# Patient Record
Sex: FEMALE | Race: BLACK OR AFRICAN AMERICAN | NOT HISPANIC OR LATINO
[De-identification: names, ages, dates, MRNs, and addresses within clinical notes are randomized per-mention and may not be internally consistent; named-entity substitution may affect disease eponyms.]

---

## 2020-05-07 ENCOUNTER — APPOINTMENT (OUTPATIENT)
Dept: HUMAN REPRODUCTION | Facility: CLINIC | Age: 25
End: 2020-05-07
Payer: COMMERCIAL

## 2020-05-07 PROCEDURE — 99203 OFFICE O/P NEW LOW 30 MIN: CPT | Mod: 95

## 2020-05-21 ENCOUNTER — APPOINTMENT (OUTPATIENT)
Dept: HUMAN REPRODUCTION | Facility: CLINIC | Age: 25
End: 2020-05-21
Payer: COMMERCIAL

## 2020-05-21 PROCEDURE — 36415 COLL VENOUS BLD VENIPUNCTURE: CPT

## 2020-05-21 PROCEDURE — 76830 TRANSVAGINAL US NON-OB: CPT

## 2020-05-21 PROCEDURE — 99214 OFFICE O/P EST MOD 30 MIN: CPT | Mod: 25

## 2020-05-22 ENCOUNTER — TRANSCRIPTION ENCOUNTER (OUTPATIENT)
Age: 25
End: 2020-05-22

## 2022-08-10 ENCOUNTER — INPATIENT (INPATIENT)
Facility: HOSPITAL | Age: 27
LOS: 4 days | Discharge: HOME | End: 2022-08-15
Attending: OBSTETRICS & GYNECOLOGY | Admitting: OBSTETRICS & GYNECOLOGY

## 2022-08-10 VITALS
DIASTOLIC BLOOD PRESSURE: 73 MMHG | TEMPERATURE: 98 F | SYSTOLIC BLOOD PRESSURE: 125 MMHG | HEART RATE: 91 BPM | RESPIRATION RATE: 14 BRPM

## 2022-08-10 DIAGNOSIS — J45.909 UNSPECIFIED ASTHMA, UNCOMPLICATED: ICD-10-CM

## 2022-08-10 DIAGNOSIS — O34.32 MATERNAL CARE FOR CERVICAL INCOMPETENCE, SECOND TRIMESTER: ICD-10-CM

## 2022-08-10 DIAGNOSIS — O99.512 DISEASES OF THE RESPIRATORY SYSTEM COMPLICATING PREGNANCY, SECOND TRIMESTER: ICD-10-CM

## 2022-08-10 DIAGNOSIS — Z3A.20 20 WEEKS GESTATION OF PREGNANCY: ICD-10-CM

## 2022-08-10 DIAGNOSIS — Z98.890 OTHER SPECIFIED POSTPROCEDURAL STATES: Chronic | ICD-10-CM

## 2022-08-10 LAB
APPEARANCE UR: CLEAR — SIGNIFICANT CHANGE UP
BASOPHILS # BLD AUTO: 0.03 K/UL — SIGNIFICANT CHANGE UP (ref 0–0.2)
BASOPHILS NFR BLD AUTO: 0.2 % — SIGNIFICANT CHANGE UP (ref 0–1)
BILIRUB UR-MCNC: NEGATIVE — SIGNIFICANT CHANGE UP
COLOR SPEC: YELLOW — SIGNIFICANT CHANGE UP
DIFF PNL FLD: NEGATIVE — SIGNIFICANT CHANGE UP
EOSINOPHIL # BLD AUTO: 0.14 K/UL — SIGNIFICANT CHANGE UP (ref 0–0.7)
EOSINOPHIL NFR BLD AUTO: 1.2 % — SIGNIFICANT CHANGE UP (ref 0–8)
GLUCOSE UR QL: SIGNIFICANT CHANGE UP
HCT VFR BLD CALC: 32.8 % — LOW (ref 37–47)
HGB BLD-MCNC: 10.7 G/DL — LOW (ref 12–16)
IMM GRANULOCYTES NFR BLD AUTO: 0.4 % — HIGH (ref 0.1–0.3)
KETONES UR-MCNC: SIGNIFICANT CHANGE UP
LEUKOCYTE ESTERASE UR-ACNC: NEGATIVE — SIGNIFICANT CHANGE UP
LYMPHOCYTES # BLD AUTO: 27.5 % — SIGNIFICANT CHANGE UP (ref 20.5–51.1)
LYMPHOCYTES # BLD AUTO: 3.35 K/UL — SIGNIFICANT CHANGE UP (ref 1.2–3.4)
MCHC RBC-ENTMCNC: 26.6 PG — LOW (ref 27–31)
MCHC RBC-ENTMCNC: 32.6 G/DL — SIGNIFICANT CHANGE UP (ref 32–37)
MCV RBC AUTO: 81.4 FL — SIGNIFICANT CHANGE UP (ref 81–99)
MONOCYTES # BLD AUTO: 0.59 K/UL — SIGNIFICANT CHANGE UP (ref 0.1–0.6)
MONOCYTES NFR BLD AUTO: 4.8 % — SIGNIFICANT CHANGE UP (ref 1.7–9.3)
NEUTROPHILS # BLD AUTO: 8.01 K/UL — HIGH (ref 1.4–6.5)
NEUTROPHILS NFR BLD AUTO: 65.9 % — SIGNIFICANT CHANGE UP (ref 42.2–75.2)
NITRITE UR-MCNC: NEGATIVE — SIGNIFICANT CHANGE UP
NRBC # BLD: 0 /100 WBCS — SIGNIFICANT CHANGE UP (ref 0–0)
PH UR: 6.5 — SIGNIFICANT CHANGE UP (ref 5–8)
PLATELET # BLD AUTO: 270 K/UL — SIGNIFICANT CHANGE UP (ref 130–400)
PROT UR-MCNC: SIGNIFICANT CHANGE UP
RBC # BLD: 4.03 M/UL — LOW (ref 4.2–5.4)
RBC # FLD: 16 % — HIGH (ref 11.5–14.5)
SARS-COV-2 RNA SPEC QL NAA+PROBE: SIGNIFICANT CHANGE UP
SP GR SPEC: 1.03 — SIGNIFICANT CHANGE UP (ref 1.01–1.03)
UROBILINOGEN FLD QL: SIGNIFICANT CHANGE UP
WBC # BLD: 12.17 K/UL — HIGH (ref 4.8–10.8)
WBC # FLD AUTO: 12.17 K/UL — HIGH (ref 4.8–10.8)

## 2022-08-10 RX ORDER — OXYTOCIN 10 UNIT/ML
333.33 VIAL (ML) INJECTION
Qty: 20 | Refills: 0 | Status: DISCONTINUED | OUTPATIENT
Start: 2022-08-10 | End: 2022-08-15

## 2022-08-10 RX ORDER — AMPICILLIN TRIHYDRATE 250 MG
2 CAPSULE ORAL EVERY 6 HOURS
Refills: 0 | Status: DISCONTINUED | OUTPATIENT
Start: 2022-08-10 | End: 2022-08-11

## 2022-08-10 RX ORDER — CHLORHEXIDINE GLUCONATE 213 G/1000ML
1 SOLUTION TOPICAL ONCE
Refills: 0 | Status: DISCONTINUED | OUTPATIENT
Start: 2022-08-10 | End: 2022-08-11

## 2022-08-10 RX ORDER — AZITHROMYCIN 500 MG/1
1 TABLET, FILM COATED ORAL ONCE
Refills: 0 | Status: COMPLETED | OUTPATIENT
Start: 2022-08-10 | End: 2022-08-10

## 2022-08-10 RX ORDER — SODIUM CHLORIDE 9 MG/ML
1000 INJECTION, SOLUTION INTRAVENOUS
Refills: 0 | Status: DISCONTINUED | OUTPATIENT
Start: 2022-08-10 | End: 2022-08-11

## 2022-08-10 RX ADMIN — Medication 200 GRAM(S): at 22:59

## 2022-08-10 RX ADMIN — AZITHROMYCIN 1 GRAM(S): 500 TABLET, FILM COATED ORAL at 22:56

## 2022-08-10 NOTE — OB PROVIDER H&P - NSHPPHYSICALEXAM_GEN_ALL_CORE
Vital Signs Last 24 Hrs  T(C): 36.8 (10 Aug 2022 19:46), Max: 36.8 (10 Aug 2022 19:46)  T(F): 98.2 (10 Aug 2022 19:46), Max: 98.2 (10 Aug 2022 19:46)  HR: 91 (10 Aug 2022 19:59) (91 - 91)  BP: 125/73 (10 Aug 2022 19:59) (125/73 - 125/73)  RR: 14 (10 Aug 2022 19:46) (14 - 14)    Gen: AAOx3, NAD  Heart; S1S2, RRR  Lungs: CTAB  Abd: soft, nontender, nondistended, no palpable contraction  Speculum: appeared dilated, bulging membrane  TVUS: membranes coming through the external os, 3cm dilation  BSS: cephalic, posterior placenta, FH 173bpm, gross fetal movement noted

## 2022-08-10 NOTE — OB PROVIDER H&P - NSLASTDATEVISIT_OBGYN_ALL_OB
Biometrics completed.     Results reviewed with a Registered Nurse; understanding of results and educational materials was verbalized.   Unknown

## 2022-08-10 NOTE — OB RN TRIAGE NOTE - FALL HARM RISK - RISK INTERVENTIONS

## 2022-08-10 NOTE — OB PROVIDER H&P - ASSESSMENT
27yo  at 20w0d, GBS unknown, with cervical insufficiency by examination, no signs of infection or  labor, for observation    -Discussed with patient and  over the phone the findings of examination. Explained cervical insufficiency and the eligibility for rescue cerclage, also explained that in this scenario, the likelihood of a successful cerclage placement is low. Options of expectant management vs IOL for termination of pregnancy also discussed. All questions answered, patient chooses to have expectant management and assessment of cerclage placement in the AM.  -admit to L&D  -admission labs  -blood cultures, Urine culture, COVID swab  -NPO after midnight  -M consult: start latency antibiotics, will re-examine in AM for eligibility of emergency rescue cerclage  -strict bedrest, with Trendelenburg  -SCDs  -IV hydration  -Ampicillin 2g q6hr, Azithromycin 1g PO once  -continuous tocometer    Dr. Bermeo and Dr. Avitia aware

## 2022-08-10 NOTE — OB PROVIDER H&P - HISTORY OF PRESENT ILLNESS
25yo  at 20w0d, ALEX 2022 by LMP (3/23/2022) c/w early ultrasound, presents from PMD office with concern that "cervix was dilated on ultrasound" during a routine scan outpatient today. Denies vaginal bleeding, abnormal discharge, or cramping pain. Also denies fever, chills, nausea/vomiting, change in bowel habits, dysuria, severe abdominal pain. This pregnancy has been uncomplicated so far. No history of leep or cervical conization.

## 2022-08-11 LAB
ALBUMIN SERPL ELPH-MCNC: 3.4 G/DL — LOW (ref 3.5–5.2)
ALP SERPL-CCNC: 55 U/L — SIGNIFICANT CHANGE UP (ref 30–115)
ALT FLD-CCNC: 68 U/L — HIGH (ref 0–41)
AMPHET UR-MCNC: NEGATIVE — SIGNIFICANT CHANGE UP
ANION GAP SERPL CALC-SCNC: 12 MMOL/L — SIGNIFICANT CHANGE UP (ref 7–14)
AST SERPL-CCNC: 43 U/L — HIGH (ref 0–41)
BARBITURATES UR SCN-MCNC: NEGATIVE — SIGNIFICANT CHANGE UP
BASOPHILS # BLD AUTO: 0.02 K/UL — SIGNIFICANT CHANGE UP (ref 0–0.2)
BASOPHILS NFR BLD AUTO: 0.2 % — SIGNIFICANT CHANGE UP (ref 0–1)
BENZODIAZ UR-MCNC: NEGATIVE — SIGNIFICANT CHANGE UP
BILIRUB SERPL-MCNC: <0.2 MG/DL — SIGNIFICANT CHANGE UP (ref 0.2–1.2)
BLD GP AB SCN SERPL QL: SIGNIFICANT CHANGE UP
BLD GP AB SCN SERPL QL: SIGNIFICANT CHANGE UP
BUN SERPL-MCNC: 5 MG/DL — LOW (ref 10–20)
BUPRENORPHINE SCREEN, URINE RESULT: NEGATIVE — SIGNIFICANT CHANGE UP
C TRACH RRNA SPEC QL NAA+PROBE: SIGNIFICANT CHANGE UP
CALCIUM SERPL-MCNC: 8.8 MG/DL — SIGNIFICANT CHANGE UP (ref 8.5–10.1)
CHLORIDE SERPL-SCNC: 102 MMOL/L — SIGNIFICANT CHANGE UP (ref 98–110)
CO2 SERPL-SCNC: 22 MMOL/L — SIGNIFICANT CHANGE UP (ref 17–32)
COCAINE METAB.OTHER UR-MCNC: NEGATIVE — SIGNIFICANT CHANGE UP
CREAT SERPL-MCNC: 0.6 MG/DL — LOW (ref 0.7–1.5)
CRP SERPL-MCNC: 17.4 MG/L — HIGH
EGFR: 126 ML/MIN/1.73M2 — SIGNIFICANT CHANGE UP
EOSINOPHIL # BLD AUTO: 0.1 K/UL — SIGNIFICANT CHANGE UP (ref 0–0.7)
EOSINOPHIL NFR BLD AUTO: 0.9 % — SIGNIFICANT CHANGE UP (ref 0–8)
FENTANYL UR QL: NEGATIVE — SIGNIFICANT CHANGE UP
GLUCOSE SERPL-MCNC: 83 MG/DL — SIGNIFICANT CHANGE UP (ref 70–99)
HCT VFR BLD CALC: 31.9 % — LOW (ref 37–47)
HGB BLD-MCNC: 10.4 G/DL — LOW (ref 12–16)
IMM GRANULOCYTES NFR BLD AUTO: 0.4 % — HIGH (ref 0.1–0.3)
L&D DRUG SCREEN, URINE: SIGNIFICANT CHANGE UP
LACTATE SERPL-SCNC: 1.2 MMOL/L — SIGNIFICANT CHANGE UP (ref 0.7–2)
LYMPHOCYTES # BLD AUTO: 2.59 K/UL — SIGNIFICANT CHANGE UP (ref 1.2–3.4)
LYMPHOCYTES # BLD AUTO: 22.1 % — SIGNIFICANT CHANGE UP (ref 20.5–51.1)
MCHC RBC-ENTMCNC: 26.3 PG — LOW (ref 27–31)
MCHC RBC-ENTMCNC: 32.6 G/DL — SIGNIFICANT CHANGE UP (ref 32–37)
MCV RBC AUTO: 80.8 FL — LOW (ref 81–99)
METHADONE UR-MCNC: NEGATIVE — SIGNIFICANT CHANGE UP
MONOCYTES # BLD AUTO: 0.41 K/UL — SIGNIFICANT CHANGE UP (ref 0.1–0.6)
MONOCYTES NFR BLD AUTO: 3.5 % — SIGNIFICANT CHANGE UP (ref 1.7–9.3)
N GONORRHOEA RRNA SPEC QL NAA+PROBE: SIGNIFICANT CHANGE UP
NEUTROPHILS # BLD AUTO: 8.54 K/UL — HIGH (ref 1.4–6.5)
NEUTROPHILS NFR BLD AUTO: 72.9 % — SIGNIFICANT CHANGE UP (ref 42.2–75.2)
NRBC # BLD: 0 /100 WBCS — SIGNIFICANT CHANGE UP (ref 0–0)
OPIATES UR-MCNC: NEGATIVE — SIGNIFICANT CHANGE UP
OXYCODONE UR-MCNC: NEGATIVE — SIGNIFICANT CHANGE UP
PCP UR-MCNC: NEGATIVE — SIGNIFICANT CHANGE UP
PLATELET # BLD AUTO: 237 K/UL — SIGNIFICANT CHANGE UP (ref 130–400)
POTASSIUM SERPL-MCNC: 4.1 MMOL/L — SIGNIFICANT CHANGE UP (ref 3.5–5)
POTASSIUM SERPL-SCNC: 4.1 MMOL/L — SIGNIFICANT CHANGE UP (ref 3.5–5)
PRENATAL SYPHILIS TEST: SIGNIFICANT CHANGE UP
PROPOXYPHENE QUALITATIVE URINE RESULT: NEGATIVE — SIGNIFICANT CHANGE UP
PROT SERPL-MCNC: 6.5 G/DL — SIGNIFICANT CHANGE UP (ref 6–8)
RBC # BLD: 3.95 M/UL — LOW (ref 4.2–5.4)
RBC # FLD: 16 % — HIGH (ref 11.5–14.5)
SODIUM SERPL-SCNC: 136 MMOL/L — SIGNIFICANT CHANGE UP (ref 135–146)
SPECIMEN SOURCE: SIGNIFICANT CHANGE UP
WBC # BLD: 11.71 K/UL — HIGH (ref 4.8–10.8)
WBC # FLD AUTO: 11.71 K/UL — HIGH (ref 4.8–10.8)

## 2022-08-11 PROCEDURE — 76817 TRANSVAGINAL US OBSTETRIC: CPT | Mod: 26

## 2022-08-11 PROCEDURE — 99222 1ST HOSP IP/OBS MODERATE 55: CPT | Mod: 25

## 2022-08-11 PROCEDURE — 99254 IP/OBS CNSLTJ NEW/EST MOD 60: CPT | Mod: 25

## 2022-08-11 RX ORDER — GENTAMICIN SULFATE 40 MG/ML
80 VIAL (ML) INJECTION EVERY 8 HOURS
Refills: 0 | Status: DISCONTINUED | OUTPATIENT
Start: 2022-08-11 | End: 2022-08-15

## 2022-08-11 RX ORDER — SODIUM CHLORIDE 9 MG/ML
1000 INJECTION, SOLUTION INTRAVENOUS
Refills: 0 | Status: DISCONTINUED | OUTPATIENT
Start: 2022-08-11 | End: 2022-08-13

## 2022-08-11 RX ADMIN — SODIUM CHLORIDE 125 MILLILITER(S): 9 INJECTION, SOLUTION INTRAVENOUS at 00:09

## 2022-08-11 RX ADMIN — Medication 100 MILLIGRAM(S): at 10:26

## 2022-08-11 RX ADMIN — Medication 104 MILLIGRAM(S): at 22:17

## 2022-08-11 RX ADMIN — Medication 1 TABLET(S): at 14:51

## 2022-08-11 RX ADMIN — Medication 104 MILLIGRAM(S): at 10:25

## 2022-08-11 RX ADMIN — Medication 100 MILLIGRAM(S): at 22:19

## 2022-08-11 RX ADMIN — Medication 100 MILLIGRAM(S): at 14:53

## 2022-08-11 RX ADMIN — Medication 200 GRAM(S): at 04:59

## 2022-08-11 RX ADMIN — Medication 200 GRAM(S): at 11:00

## 2022-08-11 RX ADMIN — Medication 104 MILLIGRAM(S): at 14:53

## 2022-08-11 NOTE — CONSULT NOTE ADULT - SUBJECTIVE AND OBJECTIVE BOX
PGY3 Antepartum Note  MFM Consult    TRIAGE/ADMISSIONI HPI:  HPI:  25yo  at 20w0d, ALEX 2022 by LMP (3/23/2022) c/w early ultrasound, presents from PMD office with concern that "cervix was dilated on ultrasound" during a routine scan outpatient today. Denies vaginal bleeding, abnormal discharge, or cramping pain. Also denies fever, chills, nausea/vomiting, change in bowel habits, dysuria, severe abdominal pain. This pregnancy has been uncomplicated so far. No history of leep or cervical conization.  (10 Aug 2022 21:37)      2022  Subjective  Ms. LEE NUNEZ is a 26y   @20w1d seen at bedside, resting comfortably. Denies abdominal pain or contractions. Denies vaginal bleeding or leakage of fluid. Perceives fetal movement. Denies abnormal vaginal discharge. Denies fever, chills, nausea, vomiting. Denies chest pain, SOB, palpitations, cough.     Review of systems:  A complete review of systems was obtained and is negative except as described in the HPI.    PAST MEDICAL & SURGICAL HISTORY:  Mild asthma  No h/o hospitalizations or intubations  History of hysteroscopy for uterine polyp.     Obstetric history:      GYN history:  No abnormal pap smears, no STDs. During this pregnancy treated for possible bacterial vaginosis.     Allergies:  No Known Allergies      MEDICATIONS  (STANDING):  ampicillin  IVPB 2 Gram(s) IV Intermittent every 6 hours  chlorhexidine 2% Cloths 1 Application(s) Topical once  lactated ringers. 1000 milliLiter(s) (125 mL/Hr) IV Continuous <Continuous>  oxytocin Infusion 333.333 milliUNIT(s)/Min (1000 mL/Hr) IV Continuous <Continuous>      FAMILY HISTORY:  No pertinent family history in first degree relatives    Social history:  No alcohol use, drug use, or smoking.      Review of systems:  A complete review of systems was obtained and is negative except as described in the HPI.    Physical exam:  T(F): 98.24 (22 @ 02:49), Max: 98.42 (08-10-22 @ 23:50)  HR: 90 (22 @ 06:35) (89 - 104)  BP: 108/65 (22 @ 06:35) (108/65 - 133/60)  RR: 16 (22 @ 02:49) (14 - 17)  I&O's Summary    10 Aug 2022 07:01  -  11 Aug 2022 06:41  --------------------------------------------------------  IN: 975 mL / OUT: 575 mL / NET: 400 mL    General:  In no apparent distress  HEENT:  Atraumatic.  Extraocular muscles intact.  CV:  Normal S1 S2.  No murmurs.  Pulmonary:  Clear to asucultation bilaterally.  No wheezing.  Abdomen:  Soft, nontender, nondistended, no rebound, no guarding.  Musculoskeletal:  No calf tenderness  Neurology:  Deep tendon reflexes 2+ bilaterally.  Psych:  Awake, alert, oriented to person, place, time.  VE (resident exam): deferred, last SVE @ 3100/-3 (exam by )  Last speculum exam on admission @, dilated, bulging membranes    [place]: no contractions    LABORATORY:                        10.7   12.17 )-----------( 270      ( 10 Aug 2022 23:30 )             32.8     08-10    136  |  102  |  5<L>  ----------------------------<  83  4.1   |  22  |  0.6<L>    Ca    8.8      10 Aug 2022 22:09    TPro  6.5  /  Alb  3.4<L>  /  TBili  <0.2  /  DBili  x   /  AST  43<H>  /  ALT  68<H>  /  AlkPhos  55  08-10      Urinalysis Basic - ( 10 Aug 2022 21:53 )    Color: Yellow / Appearance: Clear / S.027 / pH: x  Gluc: x / Ketone: Trace  / Bili: Negative / Urobili: <2 mg/dL   Blood: x / Protein: Trace / Nitrite: Negative   Leuk Esterase: Negative / RBC: x / WBC x   Sq Epi: x / Non Sq Epi: x / Bacteria: x    Additional labs:  RPR NR   Opos  COVID neg    f/u GC/Cl, Blood cultures, UCx, GBS, UDS    IMAGIN/11 BSUS: cephalic, posterior placenta, FH 173bpm, gross fetal movement noted

## 2022-08-11 NOTE — CONSULT NOTE ADULT - ATTENDING COMMENTS
MRI's of C and T spine normal-no demyelinating lesions seen. Mild degenerative changes noted(similar to prior)
[facility] Staff    I saw and evaluated Ms. LEE SIU  with Dr. Sanders.  Ms. LEE SIU reports positive fetal movement and no vaginal bleeding.  She has no pelvic pressure or contractions.    General:  Ms. LEE SIU is lying in bed in Trendelenburg.  She appears comfortable.    Vital Signs Last 24 Hrs  T(C): 36.8 (11 Aug 2022 07:51), Max: 36.9 (10 Aug 2022 23:50)  T(F): 98.24 (11 Aug 2022 07:51), Max: 98.42 (10 Aug 2022 23:50)  HR: 86 (11 Aug 2022 07:41) (86 - 104)  BP: 116/60 (11 Aug 2022 07:41) (108/65 - 133/60)  BP(mean): --  RR: 17 (11 Aug 2022 06:36) (14 - 17)  SpO2: --    Abdomen:  Soft, nontender, nondistended, no rebound, no guarding.  No fundal tenderness.  Extremities:  Nontender calves.  Speculum exam:  redundant vaginal tissue.  The cervical os was not easily seen.  Amniotic Membranes were not noted in the vaginal canal.  White  runny discharge. We did not repeat a digital exam.                        10.7   12.17 )-----------( 270      ( 10 Aug 2022 23:30 )             32.8     08-10    136  |  102  |  5   ----------------------------<  83  4.1   |  22  |  0.6    Ca    8.8      10 Aug 2022 22:09    TPro  6.5  /  Alb  3.4  /  TBili  <0.2  /  DBili  x   /  AST  43  /  ALT  68  /  AlkPhos  55  08-10-22    Tocometer:  No contractions    Ms. Siu is a 27yo  at 20w1d with cervical insufficiency.  Last night the vaginal exam was 3/100/-3.  She was placed in Trendelenburg position and started on Ampicillin.  She was also given Azithromycin x 1.  This morning we repeated the ultrasound.  There is cervical funneling with the funnel measuring approximately 4 cm.  The amniotic membranes are in the funnel.  It is not clear if there is cervical dilation.      We discussed cervical insufficiency vs  labor.  Given that there is no abdominal pain, vaginal bleeding, contractions on tocometer Ms. Siu most likely has cervical insufficiency.  We discussed not intervening, with the high likelihood that the baby would be delivered within a short period of time and would not be viable and she would lose the pregnancy, although there is the possibility that she may not deliver until a later point.  Babies are considered viable at 24 weeks gestation and beyond, and periviable at 23 weeks and beyond.  The earlier a baby is born the higher the likelihood of issues from prematurity including but not limited to respiratory issues, neurological issues such as cerebral palsy or bleeding in the brain, issues with vision, and other organ issues such as the gut.  This can result in the baby being very sick and needing long term support with eating, breathing, moving, or other life activities.      Another option is  delivering the baby now.  Another option placement of a physical exam indicated cerclage with the goal of prolonging the pregnancy. If necessary we would try to replace the amniotic membranes inside the cervical canal and then a suture would be placed in a purse string fashion.  There is the possibility of infection, bleeding, breaking the bag or water, or other organ injury such as the bladder or bowel. Sometimes a cerclage can not be placed.  Successful placement of a cerclage does not guarantee a successful pregnancy outcome.  Sometimes the cerclage helps prolong the pregnancy for a few weeks and then  labor and/or rupture of membranes occur.  This can result in the baby being born in the periviable, early viable, or  time periods which can result in the issues above.  We also discussed that sometimes cervical insufficiency can be caused by an intraamniotic infection which can be difficult to diagnose and can life threatening.  If an intraamniotic infection is diagnosed then delivery of the baby would most likely be recommended for the health of the mother regardless of the gestational age of the fetus is.   At the current time our suspicion of intraamniotic infection is low.    Ms. Siu understands that there is no "right" answer as to how to proceed and understands the risks and benefits of the options discussed. She and her mother asked questions which we answered the best of our ability.  She knows we are available to answer further questions should they arise.  At this time she would like to proceed with a physical exam indicated cerclage.  We will continue Ampicillin and add Gentamicin and Clindamycin and continue to monitor today and tonight.  Cerclage placement is tentatively scheduled for tomorrow, and she can change her mind at any time.  She understands that there is the possibility the she may deliver the fetus between now and the time of the cerclage.    AST and ALT are elevated, we will check a Hepatitis panel.  Follow up vaginal cultures.    Norberto Avitia MD

## 2022-08-11 NOTE — PROGRESS NOTE ADULT - SUBJECTIVE AND OBJECTIVE BOX
M Staff    cervical insufficiency    Transvaginal ultrasound:  There is cervical funneling with the funnel measuring approximately 4 cm.  The amniotic membranes are in the funnel.  It is not clear if there is cervical dilation.      Norberto Avitia MD

## 2022-08-11 NOTE — CONSULT NOTE ADULT - ASSESSMENT
27yo  at 20w1d, GBS unknown, with cervical insufficiency and painless cervical dilation, admitted for prolonged monitoring, currently clinically and hemodynamically stable    #cervical insufficiency  -  BSUS: cephalic, posterior placenta, FH 173bpm, gross fetal movement noted  - From admission note: Discussed with patient and  over the phone the findings of examination. Explained cervical insufficiency and the eligibility for rescue cerclage, also explained that in this scenario, the likelihood of a successful cerclage placement is low. Options of expectant management vs IOL for termination of pregnancy also discussed. All questions answered, patient chooses to have expectant management and assessment of cerclage placement in the AM.  - NPO after midnight  -strict bedrest, with Trendelenburg  - started on latency antibiotics: ampicillin started 8/10, s/p azithromycin on 8/10  - f/u blood cultures, Urine culture  - will reexamine today for eligibility of emergency rescue cerclage    #pregnancy  -SCDs  -IV hydration  -continuous tocometer    Dr. Avitia to be made aware

## 2022-08-12 ENCOUNTER — TRANSCRIPTION ENCOUNTER (OUTPATIENT)
Age: 27
End: 2022-08-12

## 2022-08-12 LAB
ALBUMIN SERPL ELPH-MCNC: 3 G/DL — LOW (ref 3.5–5.2)
ALP SERPL-CCNC: 52 U/L — SIGNIFICANT CHANGE UP (ref 30–115)
ALT FLD-CCNC: 65 U/L — HIGH (ref 0–41)
ANION GAP SERPL CALC-SCNC: 12 MMOL/L — SIGNIFICANT CHANGE UP (ref 7–14)
AST SERPL-CCNC: 43 U/L — HIGH (ref 0–41)
BASOPHILS # BLD AUTO: 0.03 K/UL — SIGNIFICANT CHANGE UP (ref 0–0.2)
BASOPHILS NFR BLD AUTO: 0.3 % — SIGNIFICANT CHANGE UP (ref 0–1)
BILIRUB SERPL-MCNC: <0.2 MG/DL — SIGNIFICANT CHANGE UP (ref 0.2–1.2)
BUN SERPL-MCNC: 4 MG/DL — LOW (ref 10–20)
CALCIUM SERPL-MCNC: 8.8 MG/DL — SIGNIFICANT CHANGE UP (ref 8.5–10.1)
CHLORIDE SERPL-SCNC: 104 MMOL/L — SIGNIFICANT CHANGE UP (ref 98–110)
CO2 SERPL-SCNC: 22 MMOL/L — SIGNIFICANT CHANGE UP (ref 17–32)
CREAT SERPL-MCNC: 0.5 MG/DL — LOW (ref 0.7–1.5)
CRP SERPL-MCNC: 20.5 MG/L — HIGH
CULTURE RESULTS: SIGNIFICANT CHANGE UP
EGFR: 132 ML/MIN/1.73M2 — SIGNIFICANT CHANGE UP
EOSINOPHIL # BLD AUTO: 0.14 K/UL — SIGNIFICANT CHANGE UP (ref 0–0.7)
EOSINOPHIL NFR BLD AUTO: 1.2 % — SIGNIFICANT CHANGE UP (ref 0–8)
GLUCOSE SERPL-MCNC: 82 MG/DL — SIGNIFICANT CHANGE UP (ref 70–99)
GROUP B BETA STREP DNA (PCR): SIGNIFICANT CHANGE UP
GROUP B BETA STREP INTERPRETATION: SIGNIFICANT CHANGE UP
HCT VFR BLD CALC: 32.6 % — LOW (ref 37–47)
HGB BLD-MCNC: 10.7 G/DL — LOW (ref 12–16)
IMM GRANULOCYTES NFR BLD AUTO: 0.4 % — HIGH (ref 0.1–0.3)
LACTATE SERPL-SCNC: 0.7 MMOL/L — SIGNIFICANT CHANGE UP (ref 0.7–2)
LYMPHOCYTES # BLD AUTO: 2.88 K/UL — SIGNIFICANT CHANGE UP (ref 1.2–3.4)
LYMPHOCYTES # BLD AUTO: 24 % — SIGNIFICANT CHANGE UP (ref 20.5–51.1)
MCHC RBC-ENTMCNC: 26.4 PG — LOW (ref 27–31)
MCHC RBC-ENTMCNC: 32.8 G/DL — SIGNIFICANT CHANGE UP (ref 32–37)
MCV RBC AUTO: 80.5 FL — LOW (ref 81–99)
MONOCYTES # BLD AUTO: 0.52 K/UL — SIGNIFICANT CHANGE UP (ref 0.1–0.6)
MONOCYTES NFR BLD AUTO: 4.3 % — SIGNIFICANT CHANGE UP (ref 1.7–9.3)
NEUTROPHILS # BLD AUTO: 8.36 K/UL — HIGH (ref 1.4–6.5)
NEUTROPHILS NFR BLD AUTO: 69.8 % — SIGNIFICANT CHANGE UP (ref 42.2–75.2)
NRBC # BLD: 0 /100 WBCS — SIGNIFICANT CHANGE UP (ref 0–0)
PLATELET # BLD AUTO: 255 K/UL — SIGNIFICANT CHANGE UP (ref 130–400)
POTASSIUM SERPL-MCNC: 4 MMOL/L — SIGNIFICANT CHANGE UP (ref 3.5–5)
POTASSIUM SERPL-SCNC: 4 MMOL/L — SIGNIFICANT CHANGE UP (ref 3.5–5)
PROT SERPL-MCNC: 6 G/DL — SIGNIFICANT CHANGE UP (ref 6–8)
RBC # BLD: 4.05 M/UL — LOW (ref 4.2–5.4)
RBC # FLD: 16 % — HIGH (ref 11.5–14.5)
SODIUM SERPL-SCNC: 138 MMOL/L — SIGNIFICANT CHANGE UP (ref 135–146)
SOURCE GROUP B STREP: SIGNIFICANT CHANGE UP
SPECIMEN SOURCE: SIGNIFICANT CHANGE UP
WBC # BLD: 11.98 K/UL — HIGH (ref 4.8–10.8)
WBC # FLD AUTO: 11.98 K/UL — HIGH (ref 4.8–10.8)

## 2022-08-12 PROCEDURE — 99233 SBSQ HOSP IP/OBS HIGH 50: CPT | Mod: 25

## 2022-08-12 PROCEDURE — 59320 REVISION OF CERVIX: CPT

## 2022-08-12 RX ORDER — AMPICILLIN TRIHYDRATE 250 MG
2 CAPSULE ORAL ONCE
Refills: 0 | Status: DISCONTINUED | OUTPATIENT
Start: 2022-08-12 | End: 2022-08-12

## 2022-08-12 RX ORDER — INDOMETHACIN 50 MG
25 CAPSULE ORAL EVERY 6 HOURS
Refills: 0 | Status: DISCONTINUED | OUTPATIENT
Start: 2022-08-12 | End: 2022-08-14

## 2022-08-12 RX ORDER — AMPICILLIN TRIHYDRATE 250 MG
2 CAPSULE ORAL EVERY 6 HOURS
Refills: 0 | Status: DISCONTINUED | OUTPATIENT
Start: 2022-08-12 | End: 2022-08-15

## 2022-08-12 RX ORDER — AMPICILLIN TRIHYDRATE 250 MG
CAPSULE ORAL
Refills: 0 | Status: DISCONTINUED | OUTPATIENT
Start: 2022-08-12 | End: 2022-08-15

## 2022-08-12 RX ORDER — AMPICILLIN TRIHYDRATE 250 MG
2 CAPSULE ORAL EVERY 6 HOURS
Refills: 0 | Status: DISCONTINUED | OUTPATIENT
Start: 2022-08-12 | End: 2022-08-12

## 2022-08-12 RX ORDER — KETOROLAC TROMETHAMINE 30 MG/ML
30 SYRINGE (ML) INJECTION ONCE
Refills: 0 | Status: DISCONTINUED | OUTPATIENT
Start: 2022-08-12 | End: 2022-08-12

## 2022-08-12 RX ORDER — INDOMETHACIN 50 MG
50 CAPSULE ORAL ONCE
Refills: 0 | Status: COMPLETED | OUTPATIENT
Start: 2022-08-12 | End: 2022-08-12

## 2022-08-12 RX ORDER — AMPICILLIN TRIHYDRATE 250 MG
CAPSULE ORAL
Refills: 0 | Status: DISCONTINUED | OUTPATIENT
Start: 2022-08-12 | End: 2022-08-12

## 2022-08-12 RX ORDER — AMPICILLIN TRIHYDRATE 250 MG
2 CAPSULE ORAL ONCE
Refills: 0 | Status: COMPLETED | OUTPATIENT
Start: 2022-08-12 | End: 2022-08-12

## 2022-08-12 RX ADMIN — Medication 204 MILLIGRAM(S): at 14:48

## 2022-08-12 RX ADMIN — Medication 204 MILLIGRAM(S): at 22:51

## 2022-08-12 RX ADMIN — Medication 50 MILLIGRAM(S): at 14:24

## 2022-08-12 RX ADMIN — Medication 30 MILLIGRAM(S): at 13:55

## 2022-08-12 RX ADMIN — Medication 25 MILLIGRAM(S): at 23:23

## 2022-08-12 RX ADMIN — Medication 100 MILLIGRAM(S): at 14:48

## 2022-08-12 RX ADMIN — Medication 25 MILLIGRAM(S): at 17:22

## 2022-08-12 RX ADMIN — Medication 200 GRAM(S): at 18:17

## 2022-08-12 RX ADMIN — Medication 100 MILLIGRAM(S): at 22:51

## 2022-08-12 RX ADMIN — Medication 100 MILLIGRAM(S): at 05:59

## 2022-08-12 RX ADMIN — Medication 50 MILLIGRAM(S): at 11:00

## 2022-08-12 RX ADMIN — Medication 1 TABLET(S): at 14:55

## 2022-08-12 RX ADMIN — Medication 200 GRAM(S): at 12:48

## 2022-08-12 RX ADMIN — Medication 200 GRAM(S): at 23:23

## 2022-08-12 RX ADMIN — SODIUM CHLORIDE 125 MILLILITER(S): 9 INJECTION, SOLUTION INTRAVENOUS at 22:53

## 2022-08-12 RX ADMIN — Medication 104 MILLIGRAM(S): at 06:00

## 2022-08-12 NOTE — OB RN INTRAOPERATIVE NOTE - NSSELHIDDEN_OBGYN_ALL_OB_FT
[[email or web address]_ALL_OB_FT:[email or web address]],[NS_DeliveryAssist1_OBGYN_ALL_OB_FT:[email or web address]=],[NS_DeliveryRN_OBGYN_ALL_OB_FT:[id number]=]

## 2022-08-12 NOTE — BRIEF OPERATIVE NOTE - OPERATION/FINDINGS
Cervix appeared 3-4cm dilated, 100% effaced with slightly bulging membranes, however membranes did not appear to be hour-glassing. Nitroglycerin administered. Membranes reduced with rhodes balloon Mersilene suture placed with knot at 6 o'clock position, rhodes balloon removed. Cervical laceration noted at 7'oclock position, non-bleeding, repaired with 3-0 chromic. Postop ultrasound done with normal amniotic fluid volume, + FHR, gross fetal movements. Cervix appeared 3-4cm dilated, 100% effaced with slightly bulging membranes, however membranes did not appear to be hour-glassing. Nitroglycerin administered. Membranes reduced with rhodes balloon inflated with 8cc NS, which was then inflated to 11cc to secure its place above the internal os. A single Mersilene suture placed high on the cervix, with first throw of the knot at 6 o'clock position.  The rhodes balloon was slowly deflated and gently removed as the knot was tightened, then tightly synched down for a secure closure of cervical canal. A total of 5 knots were tied at the 6 o'clock position.  Postop ultrasound done and confirmed normal amniotic fluid volume, + FHR, and gross fetal movements.  The suture ends were trimmed to 1" from the knot.

## 2022-08-12 NOTE — BRIEF OPERATIVE NOTE - IV INFUSIONS - CRYSTALLOIDS
1000cc LR 1000cc LR  Nitroglycerine 200mcg for uterine relaxation at time of introduction of the rhodes cather into the cervical canal.

## 2022-08-12 NOTE — CONSULT NOTE ADULT - SUBJECTIVE AND OBJECTIVE BOX
MFM f/u Consult & Pre-operative Note:   22  MFM f/u Consult & Pre-operative Note:  Please see previous [facility] complete consult note and counseling.  Ms Siu is a 27yo  at 20w1d, ALEX 2022 by LMP (3/23/2022) c/w early ultrasound, referred from Dr Plasencia\irais's office for "cervix dilated on ultrasound" during routine outpt US. She ocntinues to deny vagi bleeding, abnormal discharge, cramping or any other pain. She also denies fever, chills, nausea/vomiting, change in bowel habits, dysuria, severe abdominal pain. This pregnancy uncomplicated until this week.  She has no history of LEEP, cervical conization, D&C, but did undergo hysteroscopy for removal of uterine  polyp.  PMHx:  Mild asthma, no Hx hospitalizations or intubations              History of hysteroscopy for uterine polyp.   Allergies:  No Known Allergies  Obstetric history:    GYN history:  No abnormal pap smears, no STDs. During this pregnancy treated for possible bacterial vaginosis.                       Hysteroscopy for cervical polyp.     MEDS:  ampicillin  IVPB 2 Gram(s) IV Intermittent every 6 hours             gentamycin 80mg IVPB q 8h             clindamycin  FAMILY HISTORY:  Non contrib.   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  LIves with .  Mother involved and suppportive.  No alcohol use, drug use, or smoking.    Review of systems:  See HPI.  Physical exam:  VSS, afeb  Intrviewed and examined and counsled lying in bed in steep trendelenberg, which she is tolerating well.   HEENT:  NC/AT.  Lungs Clear Bilat  Abd: Fundus soft and NT. VE (resident exam): deferred, last SVE @2037 3/100/-3 (exam by )  Last speculum exam: :  NEFG, Vag nl, Unable to visualize expternal os.  No prolapsed or bulging membranes detected. See TVUS below.   [place]: no contractions have been detected on this admission.    LAB:  Opos/AntibNeg.  HbEP not found.           8/10    10.7   12.17 )-----------( 270      ( 10 Aug 2022 23:30 )             32.8%  136  |  102  |  5<L>  ----------------------------<  83  4.1   |  22  |  0.6<L>  RPR NR  Ca    8.8      10 Aug 2022 22:09  TPro  6.5  /  Alb  3.4<L>  /  TBili  <0.2  /  DBili  x   /  AST  43<H>  /  ALT  68<H>  /  AlkPhos  55  08-10  Urinalysis Basic - ( 10 Aug 2022 21:53 ) Clear / S.027 / tr ketones, dip neg.   f/u GC/Cl, Blood cultures, UCx, GBS, UDS    IMAGIN/11 BSUS: cephalic, posterior placenta, FH 173bpm, gross fetal movement noted         TVUS: Large funnel up to external os which appears to be 3mm dilated and completely effaced.     Impression/Plan: We have discussed     Assessment and Recommendation:   · Assessment	  27yo  at 20w1d, GBS unknown, with cervical insufficiency and painless cervical dilation, admitted for prolonged monitoring, currently clinically and hemodynamically stable    1.  Ms Siu presented with painless dilation, classic for cervical insufficiency.  We have discussed yesterday and today with patient and  over the phone the findings of examination. Explained cervical insufficiency and the eligibility for rescue cerclage, also explained that in this scenario, the likelihood of a successful cerclage placement is about 60% with a significant risk of pregnancy loss about 40%. Options of expectant management vs IOL for termination of pregnancy also discussed. All questions answered, patient     2.

## 2022-08-12 NOTE — PROGRESS NOTE ADULT - SUBJECTIVE AND OBJECTIVE BOX
PGY3 Note    Reason for Admission: cervical insufficiency     Gestational Age: 20.2  Hospital Day: 3    Pt seen and examined at bedside resting comfortably in Trendelenburg. Denies abdominal pain, vaginal bleeding, or leakage of fluid. Reports fetal movement. Denies fever, chills, nausea, vomiting. Voiding on bedpan without difficulty. NPO since midnight. Denies chest pain, SOB, palpitations, LE swelling.     MEDICATIONS  (STANDING):  clindamycin IVPB      clindamycin IVPB 900 milliGRAM(s) IV Intermittent every 8 hours  gentamicin   IVPB 80 milliGRAM(s) IV Intermittent every 8 hours  lactated ringers. 1000 milliLiter(s) (125 mL/Hr) IV Continuous <Continuous>  oxytocin Infusion 333.333 milliUNIT(s)/Min (1000 mL/Hr) IV Continuous <Continuous>  prenatal multivitamin 1 Tablet(s) Oral daily    MEDICATIONS  (PRN):      PAST MEDICAL & SURGICAL HISTORY:  Mild asthma  No h/o hospitalizations or intubations      History of hysteroscopy          Physical Exam:   Vital Signs Last 24 Hrs  T(C): 35.7 (12 Aug 2022 04:00), Max: 36.8 (11 Aug 2022 07:51)  T(F): 96.2 (12 Aug 2022 04:00), Max: 98.24 (11 Aug 2022 07:51)  HR: 97 (12 Aug 2022 04:00) (77 - 99)  BP: 113/51 (12 Aug 2022 04:00) (107/65 - 117/61)  RR: 18 (12 Aug 2022 04:00) (18 - 18)    GA: AOX3, NAD   Cardio: RRR  Resp: CTAB  Abdomen: gravid, soft, nontender, no palpable contractions   MSK: normal passive and active range of motion   Neuro: CN2-12 intact   Extremities: no swelling or erythema noted   Psych: normal mood and affect, no suicidal or homicidal ideations     Labs:                        10.4   11.71 )-----------( 237      ( 11 Aug 2022 10:45 )             31.9                         10.7   12.17 )-----------( 270      ( 10 Aug 2022 23:30 )             32.8      08-10 @ 22:09    136  |  102  |  5   ----------------------------<  83  4.1   |  22  |  0.6        TPro  6.5  /  Alb  3.4  /  TBili  <0.2  /  DBili  x   /  AST  43  /  ALT  68  /  AlkPhos  55  08-10 @ 22:09    GC/CT neg  UDS neg       CRP 17.4  Lactate 1.2    f/u Blood cultures, UCx, GBS    IMAGIN/10 TVUS: membranes coming through the external os, 3cm dilation, BSUS: cephalic, posterior placenta, FH 173bpm, gross fetal movement noted   Transvaginal ultrasound:  There is cervical funneling with the funnel measuring approximately 4 cm.  The amniotic membranes are in the funnel.  It is not clear if there is cervical dilation.

## 2022-08-12 NOTE — BRIEF OPERATIVE NOTE - NSICDXBRIEFPREOP_GEN_ALL_CORE_FT
PRE-OP DIAGNOSIS:  Cervical insufficiency in pregnancy, antepartum 12-Aug-2022 11:27:02  Lillian Sanders

## 2022-08-12 NOTE — BRIEF OPERATIVE NOTE - NSICDXBRIEFPOSTOP_GEN_ALL_CORE_FT
POST-OP DIAGNOSIS:  Cervical insufficiency during pregnancy, antepartum, first trimester 12-Aug-2022 11:27:23  Lillian Sanders

## 2022-08-12 NOTE — PRE-ANESTHESIA EVALUATION ADULT - NSANTHPMHFT_GEN_ALL_CORE
25yo  at 20w2d, GBS unknown, with cervical insufficiency and painless cervical dilation, admitted for prolonged monitoring, currently clinically and hemodynamically stable.    #cervical insufficiency  -  Transvaginal ultrasound:  There is cervical funneling with the funnel measuring approximately 4 cm.  The amniotic membranes are in the funnel.  It is not clear if there is cervical dilation.    -strict bedrest, with Trendelenburg  - started on latency antibiotics: ampicillin started 8/10, s/p azithromycin on 8/10      #pregnancy  - f/u AM labs  -SCDs  -IV hydration 25yo  at 20w2d, GBS unknown, with cervical insufficiency and painless cervical dilation, admitted for prolonged monitoring, currently clinically and hemodynamically stable.    #cervical insufficiency  -  Transvaginal ultrasound:  There is cervical funneling with the funnel measuring approximately 4 cm.  The amniotic membranes are in the funnel.  It is not clear if there is cervical dilation.    -strict bedrest, with Trendelenburg  - started on latency antibiotics: ampicillin started 8/10, s/p azithromycin on 8/10

## 2022-08-13 LAB
BASOPHILS # BLD AUTO: 0.04 K/UL — SIGNIFICANT CHANGE UP (ref 0–0.2)
BASOPHILS NFR BLD AUTO: 0.4 % — SIGNIFICANT CHANGE UP (ref 0–1)
EOSINOPHIL # BLD AUTO: 0.2 K/UL — SIGNIFICANT CHANGE UP (ref 0–0.7)
EOSINOPHIL NFR BLD AUTO: 1.9 % — SIGNIFICANT CHANGE UP (ref 0–8)
HAV IGM SER-ACNC: SIGNIFICANT CHANGE UP
HBV CORE IGM SER-ACNC: SIGNIFICANT CHANGE UP
HBV SURFACE AG SER-ACNC: SIGNIFICANT CHANGE UP
HCT VFR BLD CALC: 31.3 % — LOW (ref 37–47)
HCV AB S/CO SERPL IA: 0.11 S/CO — SIGNIFICANT CHANGE UP (ref 0–0.99)
HCV AB SERPL-IMP: SIGNIFICANT CHANGE UP
HGB BLD-MCNC: 10.2 G/DL — LOW (ref 12–16)
IMM GRANULOCYTES NFR BLD AUTO: 0.3 % — SIGNIFICANT CHANGE UP (ref 0.1–0.3)
LYMPHOCYTES # BLD AUTO: 2.3 K/UL — SIGNIFICANT CHANGE UP (ref 1.2–3.4)
LYMPHOCYTES # BLD AUTO: 22.1 % — SIGNIFICANT CHANGE UP (ref 20.5–51.1)
MCHC RBC-ENTMCNC: 26 PG — LOW (ref 27–31)
MCHC RBC-ENTMCNC: 32.6 G/DL — SIGNIFICANT CHANGE UP (ref 32–37)
MCV RBC AUTO: 79.8 FL — LOW (ref 81–99)
MONOCYTES # BLD AUTO: 0.64 K/UL — HIGH (ref 0.1–0.6)
MONOCYTES NFR BLD AUTO: 6.1 % — SIGNIFICANT CHANGE UP (ref 1.7–9.3)
NEUTROPHILS # BLD AUTO: 7.2 K/UL — HIGH (ref 1.4–6.5)
NEUTROPHILS NFR BLD AUTO: 69.2 % — SIGNIFICANT CHANGE UP (ref 42.2–75.2)
NRBC # BLD: 0 /100 WBCS — SIGNIFICANT CHANGE UP (ref 0–0)
PLATELET # BLD AUTO: 262 K/UL — SIGNIFICANT CHANGE UP (ref 130–400)
RBC # BLD: 3.92 M/UL — LOW (ref 4.2–5.4)
RBC # FLD: 16.1 % — HIGH (ref 11.5–14.5)
WBC # BLD: 10.41 K/UL — SIGNIFICANT CHANGE UP (ref 4.8–10.8)
WBC # FLD AUTO: 10.41 K/UL — SIGNIFICANT CHANGE UP (ref 4.8–10.8)

## 2022-08-13 PROCEDURE — 99232 SBSQ HOSP IP/OBS MODERATE 35: CPT

## 2022-08-13 RX ADMIN — Medication 25 MILLIGRAM(S): at 12:00

## 2022-08-13 RX ADMIN — SODIUM CHLORIDE 125 MILLILITER(S): 9 INJECTION, SOLUTION INTRAVENOUS at 11:53

## 2022-08-13 RX ADMIN — Medication 100 MILLIGRAM(S): at 21:33

## 2022-08-13 RX ADMIN — Medication 25 MILLIGRAM(S): at 06:39

## 2022-08-13 RX ADMIN — Medication 204 MILLIGRAM(S): at 21:33

## 2022-08-13 RX ADMIN — Medication 1 TABLET(S): at 11:53

## 2022-08-13 RX ADMIN — Medication 25 MILLIGRAM(S): at 11:53

## 2022-08-13 RX ADMIN — Medication 204 MILLIGRAM(S): at 06:39

## 2022-08-13 RX ADMIN — Medication 200 GRAM(S): at 23:22

## 2022-08-13 RX ADMIN — Medication 25 MILLIGRAM(S): at 23:36

## 2022-08-13 RX ADMIN — Medication 200 GRAM(S): at 17:31

## 2022-08-13 RX ADMIN — Medication 200 GRAM(S): at 11:54

## 2022-08-13 RX ADMIN — Medication 25 MILLIGRAM(S): at 19:26

## 2022-08-13 RX ADMIN — Medication 204 MILLIGRAM(S): at 14:29

## 2022-08-13 RX ADMIN — Medication 100 MILLIGRAM(S): at 14:29

## 2022-08-13 RX ADMIN — Medication 25 MILLIGRAM(S): at 17:31

## 2022-08-13 RX ADMIN — Medication 200 GRAM(S): at 05:08

## 2022-08-13 RX ADMIN — Medication 100 MILLIGRAM(S): at 05:08

## 2022-08-13 NOTE — PROGRESS NOTE ADULT - SUBJECTIVE AND OBJECTIVE BOX
PGY2 NOTE    Gestational Age: 20w3d  Hospital Day: 4  Post-Op Day: 1    HPI: Pt seen and examined at bedside. She is doing well, no overnight events, no acute complaints. She denies contractions but does endorse mild and infrequent abdominal cramping. Denies leakage of fluid, or vaginal bleeding/discharge. She feels regular fetal movement.  Bedrest with bathroom privileges.   Voiding without difficulty.   Tolerating regular PO diet.   Denies HA, lightheadedness, palpitations, N/V, fevers, chills, CP, SOB, LE edema,  urinary symptoms, changes in bowel habits.    PAST MEDICAL & SURGICAL HISTORY:  Mild asthma  No h/o hospitalizations or intubations  History of hysteroscopy    Vital Signs Last 24 Hrs  T(F): 96.4 (13 Aug 2022 03:54), Max: 98.6 (12 Aug 2022 08:35)  HR: 83 (13 Aug 2022 03:54) (83 - 102)  BP: 100/53 (13 Aug 2022 03:54) (96/54 - 133/84)  RR: 18 (13 Aug 2022 03:54) (18 - 18)    Physical exam:  General - AAOx3, NAD  Heart - S1S2, RRR  Lungs - CTA BL  Abdomen - Gravid, soft, nontender, no palpable contractions  Vagina/Pelvis - No bleeding, deferred  Extremities - No calf tenderness, no swelling    Labs:                        10.7   11.98 )-----------( 255      ( 12 Aug 2022 07:12 )             32.6                         10.4   11.71 )-----------( 237      ( 11 Aug 2022 10:45 )             31.9     138  |  104  |  4  ----------------------------<82  4.0  |  22  |  0.5    Antibody Screen: NEG (08-11-22 @ 11:02)  Antibody Screen: NEG (08-10-22 @ 23:36)    8/10 @2330 12.17>10.7/32.8<270; 136/4.1/102/22/5/0.6<83; AST/ALT 43/63, RPR NR, Opos, COVID neg, UDS neg, GC/CT neg  8/11 11.71>10.4/31.9<237, CRP 17.4 (H), Lactate 1.2  8/12 11.98>10.7/32.6<255, 138/4/104/22/12/4/0.5<82, AST/ALT 43/65, CRP 20.5, lactate 0.7  blood cx NGTD (8/12 PM), UCx negative, GBS negative, acute hepatitis panel - NR    8/10 TVUS: membranes coming through the external os, 3cm dilation  BSS: cephalic, posterior placenta, FH 173bpm, gross fetal movement noted    8/10 Speculum: appeared dilated, bulging membrane  SVE @2037 3/100/-3    MEDICATIONS  (STANDING):  ampicillin  IVPB      ampicillin  IVPB 2 Gram(s) IV Intermittent every 6 hours  clindamycin IVPB      clindamycin IVPB 900 milliGRAM(s) IV Intermittent every 8 hours  gentamicin   IVPB 80 milliGRAM(s) IV Intermittent every 8 hours  indomethacin 25 milliGRAM(s) Oral every 6 hours  lactated ringers. 1000 milliLiter(s) (125 mL/Hr) IV Continuous <Continuous>  oxytocin Infusion 333.333 milliUNIT(s)/Min (1000 mL/Hr) IV Continuous <Continuous>  prenatal multivitamin 1 Tablet(s) Oral daily         PGY2 NOTE    Gestational Age: 20w3d  Hospital Day: 4  Post-Op Day: 1    HPI: Pt seen and examined at bedside. She is doing well, no overnight events, no acute complaints. She denies contractions but does endorse mild and infrequent abdominal cramping. Denies leakage of fluid, or vaginal bleeding/discharge. She feels regular fetal movement.  Bedrest with bathroom privileges.   Voiding without difficulty.   Tolerating regular PO diet.   Denies HA, lightheadedness, palpitations, N/V, fevers, chills, CP, SOB, LE edema,  urinary symptoms, changes in bowel habits.    PAST MEDICAL & SURGICAL HISTORY:  Mild asthma  No h/o hospitalizations or intubations  History of hysteroscopy    Vital Signs Last 24 Hrs  T(F): 96.4 (13 Aug 2022 03:54), Max: 98.6 (12 Aug 2022 08:35)  HR: 83 (13 Aug 2022 03:54) (83 - 102)  BP: 100/53 (13 Aug 2022 03:54) (96/54 - 133/84)  RR: 18 (13 Aug 2022 03:54) (18 - 18)    Physical exam:  General - AAOx3, NAD  Heart - S1S2, RRR  Lungs - CTA BL  Abdomen - Gravid, soft, nontender, no palpable contractions  Vagina/Pelvis - No bleeding, deferred  Extremities - No calf tenderness, no swelling  BSS - +FHR 150bpm, daryl presentation, gross fetal movement    Labs:                        10.7   11.98 )-----------( 255      ( 12 Aug 2022 07:12 )             32.6                         10.4   11.71 )-----------( 237      ( 11 Aug 2022 10:45 )             31.9     138  |  104  |  4  ----------------------------<82  4.0  |  22  |  0.5    Antibody Screen: NEG (08-11-22 @ 11:02)  Antibody Screen: NEG (08-10-22 @ 23:36)    8/10 @2330 12.17>10.7/32.8<270; 136/4.1/102/22/5/0.6<83; AST/ALT 43/63, RPR NR, Opos, COVID neg, UDS neg, GC/CT neg  8/11 11.71>10.4/31.9<237, CRP 17.4 (H), Lactate 1.2  8/12 11.98>10.7/32.6<255, 138/4/104/22/12/4/0.5<82, AST/ALT 43/65, CRP 20.5, lactate 0.7  blood cx NGTD (8/12 PM), UCx negative, GBS negative, acute hepatitis panel - NR    8/10 TVUS: membranes coming through the external os, 3cm dilation  BSS: cephalic, posterior placenta, FH 173bpm, gross fetal movement noted    8/10 Speculum: appeared dilated, bulging membrane  SVE @2037 3/100/-3    MEDICATIONS  (STANDING):  ampicillin  IVPB      ampicillin  IVPB 2 Gram(s) IV Intermittent every 6 hours  clindamycin IVPB      clindamycin IVPB 900 milliGRAM(s) IV Intermittent every 8 hours  gentamicin   IVPB 80 milliGRAM(s) IV Intermittent every 8 hours  indomethacin 25 milliGRAM(s) Oral every 6 hours  lactated ringers. 1000 milliLiter(s) (125 mL/Hr) IV Continuous <Continuous>  oxytocin Infusion 333.333 milliUNIT(s)/Min (1000 mL/Hr) IV Continuous <Continuous>  prenatal multivitamin 1 Tablet(s) Oral daily

## 2022-08-13 NOTE — CHART NOTE - NSCHARTNOTEFT_GEN_A_CORE
Patient seen at bedside for complaints of mild shortness of breath worsened when laying down. Denies any chest pain, headaches, LE pain, cough, dizziness, nausea, vision changes.     Vital Signs Last 24 Hrs  T(C): 36.9 (13 Aug 2022 16:03), Max: 36.9 (13 Aug 2022 16:03)  T(F): 98.5 (13 Aug 2022 16:03), Max: 98.5 (13 Aug 2022 16:03)  HR: 76 (13 Aug 2022 17:08) (76 - 97)  BP: 119/69 (13 Aug 2022 17:08) (96/54 - 121/63)  RR: 16 (13 Aug 2022 16:03) (16 - 18)  SpO2: 100% (13 Aug 2022 17:08) (100% - 100%)    Gen: AOx3 ,NAD  Pulm: CTA BL, No Rubs, rhales, rhonchi, wheezes  Cardio: RRR ,S1S2  Ext: No swelling, tenderness, negative homans    Vitals are stable, physical exam is unremarkable.    Will continue to monitor closely.    Dr. De La Cruz and Dr. Ellington to be made aware.
Transfer Note    Transfer from: L&D  Transfer to:       L&D COURSE:  Admitted to L&D for coincidental cervical insufficiency that was noted on sonogram on 8/10.  Received azithromycin x1 dose and ampicillin. Patient remained asymptomatic.  Placed in trendelenberg and strict bedrest.  No contractions noted on toco.      ASSESSMENT & PLAN:   - transfer to   - cont amp/gent/clinda  - strict bedrest, no bathroom privileges  - SCDs  - reg diet, NPO at midnight  - plan for amniocentesis and cerclage @0730 on 8/12         Vital Signs Last 24 Hrs  T(C): 36.8 (11 Aug 2022 07:51), Max: 36.9 (10 Aug 2022 23:50)  T(F): 98.24 (11 Aug 2022 07:51), Max: 98.42 (10 Aug 2022 23:50)  HR: 86 (11 Aug 2022 07:41) (86 - 104)  BP: 116/60 (11 Aug 2022 07:41) (108/65 - 133/60)  BP(mean): --  RR: 17 (11 Aug 2022 06:36) (14 - 17)  SpO2: --      I&O's Summary    10 Aug 2022 07:01  -  11 Aug 2022 07:00  --------------------------------------------------------  IN: 1100 mL / OUT: 685 mL / NET: 415 mL    11 Aug 2022 07:01  -  11 Aug 2022 10:22  --------------------------------------------------------  IN: 125 mL / OUT: 250 mL / NET: -125 mL          MEDICATIONS  (STANDING):  ampicillin  IVPB 2 Gram(s) IV Intermittent every 6 hours  chlorhexidine 2% Cloths 1 Application(s) Topical once  clindamycin IVPB      clindamycin IVPB 900 milliGRAM(s) IV Intermittent once  clindamycin IVPB 900 milliGRAM(s) IV Intermittent every 8 hours  gentamicin   IVPB 80 milliGRAM(s) IV Intermittent every 8 hours  lactated ringers. 1000 milliLiter(s) (125 mL/Hr) IV Continuous <Continuous>  oxytocin Infusion 333.333 milliUNIT(s)/Min (1000 mL/Hr) IV Continuous <Continuous>  prenatal multivitamin 1 Tablet(s) Oral daily    MEDICATIONS  (PRN):        LABS                                            10.7                  Neurophils% (auto):   65.9   (08-10 @ 23:30):    12.17)-----------(270          Lymphocytes% (auto):  27.5                                          32.8                   Eosinphils% (auto):   1.2      Manual%: Neutrophils x    ; Lymphocytes x    ; Eosinophils x    ; Bands%: x    ; Blasts x                                    136    |  102    |  5                   Calcium: 8.8   / iCa: x      (08-10 @ 22:09)    ----------------------------<  83        Magnesium: x                                4.1     |  22     |  0.6              Phosphorous: x        TPro  6.5    /  Alb  3.4    /  TBili  <0.2   /  DBili  x      /  AST  43     /  ALT  68     /  AlkPhos  55     10 Aug 2022 22:09        Dr. Soliz and Dr. Avitia aware.
PACU ANESTHESIA ADMISSION NOTE      Procedure: Cervical cerclage      Post op diagnosis:  Cervical insufficiency during pregnancy, antepartum, first trimester        ____  Intubated  TV:______       Rate: ______      FiO2: ______    __x__  Patent Airway    __x__  Full return of protective reflexes    _x__  Regressing NAB; able to move bilateral lower extremities against gravity    Vitals:   T:   98.2F        R:    18             BP:  126/59                Sat:   99                P: 82      Mental Status:  __x__ Awake   __x___ Alert   _____ Drowsy   _____ Sedated    Nausea/Vomiting:  __x__ NO  ______Yes,   See Post - Op Orders          Pain Scale (0-10):  0/10_____    Treatment: ____ None    __x__ See Post - Op/PCA Orders    Post - Operative Fluids:   ____ Oral   __x__ See Post - Op Orders    Plan: Discharge:   ____Home       ___x__Floor     _____Critical Care    _____  Other:_________________    Comments: Pt tolerated procedure well, no anesthesia related complications. Care of pt endorsed to PACU, report given to PACU RN. Discharge to the next level of care when criteria are met.

## 2022-08-14 RX ADMIN — Medication 200 GRAM(S): at 12:28

## 2022-08-14 RX ADMIN — Medication 204 MILLIGRAM(S): at 14:47

## 2022-08-14 RX ADMIN — Medication 100 MILLIGRAM(S): at 14:15

## 2022-08-14 RX ADMIN — Medication 100 MILLIGRAM(S): at 06:10

## 2022-08-14 RX ADMIN — Medication 25 MILLIGRAM(S): at 06:10

## 2022-08-14 RX ADMIN — Medication 204 MILLIGRAM(S): at 21:36

## 2022-08-14 RX ADMIN — Medication 200 GRAM(S): at 17:56

## 2022-08-14 RX ADMIN — Medication 1 TABLET(S): at 12:28

## 2022-08-14 RX ADMIN — Medication 200 GRAM(S): at 06:10

## 2022-08-14 RX ADMIN — Medication 200 GRAM(S): at 23:48

## 2022-08-14 RX ADMIN — Medication 100 MILLIGRAM(S): at 22:41

## 2022-08-14 RX ADMIN — Medication 204 MILLIGRAM(S): at 06:10

## 2022-08-14 NOTE — PROGRESS NOTE ADULT - SUBJECTIVE AND OBJECTIVE BOX
PGY2 NOTE    Gestational Age: 20w4d  Hospital Day: 5  Post-Op Day: 2    HPI: Pt seen and examined at bedside. She is doing well, no overnight events, no acute complaints. She denies contractions or abdominal cramping. Denies leakage of fluid, or vaginal bleeding/discharge. She feels regular fetal movement.  Bedrest with bathroom privileges.   Voiding without difficulty.   Tolerating regular PO diet.   Denies HA, lightheadedness, palpitations, N/V, fevers, chills, CP, SOB, LE edema,  urinary symptoms, changes in bowel habits.    PAST MEDICAL & SURGICAL HISTORY:  Mild asthma  No h/o hospitalizations or intubations  History of hysteroscopy    Vital Signs Last 24 Hrs  Vital Signs Last 24 Hrs  T(C): 36.3 (14 Aug 2022 03:19), Max: 36.9 (13 Aug 2022 16:03)  T(F): 97.3 (14 Aug 2022 03:19), Max: 98.5 (13 Aug 2022 16:03)  HR: 89 (14 Aug 2022 03:19) (76 - 97)  BP: 115/60 (14 Aug 2022 03:19) (108/62 - 121/63)  RR: 18 (14 Aug 2022 03:19) (16 - 18)  SpO2: 100% (13 Aug 2022 17:08) (100% - 100%)    Physical exam:  General - AAOx3, NAD  Heart - S1S2, RRR  Lungs - CTA BL  Abdomen - Gravid, soft, nontender, no palpable contractions  Vagina/Pelvis - No bleeding, deferred  Extremities - No calf tenderness, no swelling  BSS - FHR 144bpm, daryl presentation, gross fetal movement    Labs:                        10.7   11.98 )-----------( 255      ( 12 Aug 2022 07:12 )             32.6                         10.4   11.71 )-----------( 237      ( 11 Aug 2022 10:45 )             31.9     138  |  104  |  4  ----------------------------<82  4.0  |  22  |  0.5    Antibody Screen: NEG (08-11-22 @ 11:02)  Antibody Screen: NEG (08-10-22 @ 23:36)    8/10 @2330 12.17>10.7/32.8<270; 136/4.1/102/22/5/0.6<83; AST/ALT 43/63, RPR NR, Opos, COVID neg, UDS neg, GC/CT neg  8/11 11.71>10.4/31.9<237, CRP 17.4 (H), Lactate 1.2  8/12 11.98>10.7/32.6<255, 138/4/104/22/12/4/0.5<82, AST/ALT 43/65, CRP 20.5, lactate 0.7  blood cx NGTD (8/12 PM), UCx negative, GBS negative, acute hepatitis panel - NR    8/10 TVUS: membranes coming through the external os, 3cm dilation  BSS: cephalic, posterior placenta, FH 173bpm, gross fetal movement noted    8/10 Speculum: appeared dilated, bulging membrane  SVE @2037 3/100/-3    8/13 10.41>10.2/31.3<262    MEDICATIONS  (STANDING):  ampicillin  IVPB      ampicillin  IVPB 2 Gram(s) IV Intermittent every 6 hours  clindamycin IVPB      clindamycin IVPB 900 milliGRAM(s) IV Intermittent every 8 hours  gentamicin   IVPB 80 milliGRAM(s) IV Intermittent every 8 hours  indomethacin 25 milliGRAM(s) Oral every 6 hours  lactated ringers. 1000 milliLiter(s) (125 mL/Hr) IV Continuous <Continuous>  oxytocin Infusion 333.333 milliUNIT(s)/Min (1000 mL/Hr) IV Continuous <Continuous>  prenatal multivitamin 1 Tablet(s) Oral daily

## 2022-08-15 ENCOUNTER — TRANSCRIPTION ENCOUNTER (OUTPATIENT)
Age: 27
End: 2022-08-15

## 2022-08-15 VITALS
DIASTOLIC BLOOD PRESSURE: 58 MMHG | SYSTOLIC BLOOD PRESSURE: 121 MMHG | TEMPERATURE: 98 F | RESPIRATION RATE: 18 BRPM | HEART RATE: 92 BPM

## 2022-08-15 PROCEDURE — 76815 OB US LIMITED FETUS(S): CPT | Mod: 26

## 2022-08-15 PROCEDURE — 99238 HOSP IP/OBS DSCHRG MGMT 30/<: CPT | Mod: 25

## 2022-08-15 RX ADMIN — Medication 204 MILLIGRAM(S): at 05:57

## 2022-08-15 RX ADMIN — Medication 100 MILLIGRAM(S): at 05:57

## 2022-08-15 RX ADMIN — Medication 200 GRAM(S): at 06:34

## 2022-08-15 NOTE — DISCHARGE NOTE ANTEPARTUM - CARE PLAN
1 Principal Discharge DX:	Cervical insufficiency in pregnancy, antepartum, second trimester  Assessment and plan of treatment:	- pelvic rest precautions: nothing in the vagina for remainder of pregnancy, no sex, no toys, no tampons  - If you have contractions every few minutes, vaginal bleeding, leakage of fluid or you don't feel the baby move please call your doctor or come to labor and delivery.   Principal Discharge DX:	Cervical insufficiency in pregnancy, antepartum, second trimester  Assessment and plan of treatment:	- pelvic rest precautions: nothing in the vagina for remainder of pregnancy, no sex, no toys, no tampons  - If you have contractions every few minutes, vaginal bleeding, leakage of fluid or you don't feel the baby move please call your doctor or come to labor and delivery.  -continue your prenatal vitamin daily

## 2022-08-15 NOTE — PROGRESS NOTE ADULT - ASSESSMENT
Assessment and Plan:   27yo  at 20w4d, s/p cerclage for cervical insufficiency POD #2, GBS negative, recovering well, currently clinically and hemodynamically stable    #cervical insufficiency  -  Transvaginal ultrasound:  There is cervical funneling with the funnel measuring approximately 4 cm.  The amniotic membranes are in the funnel.  It is not clear if there is cervical dilation.  - s/p cerclage placement on 8/12 AM  - bedrest with bathroom privileges, Trendelenburg   - on amp/gent/clinda  - s/p indomethacin x48hrs    #pregnancy  - DVT ppx with SCDs  - PNVs  - IV hydration    Dr. Richard and Dr. Robertson to be made aware    
25yo  at 20w3d, s/p cerclage for cervical insufficiency POD #1, GBS negative, recovering well, currently clinically and hemodynamically stable    #cervical insufficiency  -  Transvaginal ultrasound:  There is cervical funneling with the funnel measuring approximately 4 cm.  The amniotic membranes are in the funnel.  It is not clear if there is cervical dilation.  - s/p cerclage placement on 8/12 AM  - bedrest with bathroom privileges, Trendelenburg   - on amp/gent/clinda  - indomethacin x48hrs      #pregnancy  - f/u 1100 labs  - DVT ppx with SCDs  - PNVs  - IV hydration       and  to be aware
25yo  at 20w5d, s/p rescue cerclage for cervical insufficiency POD #3, GBS negative, recovering well, currently clinically and hemodynamically stable    #cervical insufficiency  -  Transvaginal ultrasound:  There is cervical funneling with the funnel measuring approximately 4 cm.  The amniotic membranes are in the funnel.  It is not clear if there is cervical dilation.  - s/p cerclage placement on 8/12 AM  - bedrest with bathroom privileges, Trendelenburg   - s/p amp/gent/clinda x72h  - s/p indomethacin x48hrs    #pregnancy  - DVT ppx with SCDs  - PNVs  - IV hydration    Anticipate d/c home today.    Dr. Avitia to be made aware
25yo  at 20w2d, GBS unknown, with cervical insufficiency and painless cervical dilation, admitted for prolonged monitoring, currently clinically and hemodynamically stable    #cervical insufficiency  -  Transvaginal ultrasound:  There is cervical funneling with the funnel measuring approximately 4 cm.  The amniotic membranes are in the funnel.  It is not clear if there is cervical dilation.    - Plan for amniocentesis and cerclage this AM in L&D OR  - NPO after midnight  -strict bedrest, with Trendelenburg  - started on latency antibiotics: ampicillin started 8/10, s/p azithromycin on 8/10  - f/u blood cultures, Urine culture    #pregnancy  - f/u AM labs  -SCDs  -IV hydration     aware

## 2022-08-15 NOTE — DISCHARGE NOTE ANTEPARTUM - PLAN OF CARE
- pelvic rest precautions: nothing in the vagina for remainder of pregnancy, no sex, no toys, no tampons  - If you have contractions every few minutes, vaginal bleeding, leakage of fluid or you don't feel the baby move please call your doctor or come to labor and delivery. - pelvic rest precautions: nothing in the vagina for remainder of pregnancy, no sex, no toys, no tampons  - If you have contractions every few minutes, vaginal bleeding, leakage of fluid or you don't feel the baby move please call your doctor or come to labor and delivery.  -continue your prenatal vitamin daily

## 2022-08-15 NOTE — PROGRESS NOTE ADULT - ATTENDING COMMENTS
[facility] Staff    I saw and evaluated Ms. LEE SIU  with Dr. Torres. Ms. LEE SIU reports no cramping, contractions, vaginal bleeding, or leakage of fluid.    General:  Ms. LEE SIU is lying in bed.  She appears comfortable.    Vital Signs Last 24 Hrs  T(C): 36.6 (15 Aug 2022 07:54), Max: 36.6 (15 Aug 2022 07:54)  T(F): 97.8 (15 Aug 2022 07:54), Max: 97.8 (15 Aug 2022 07:54)  HR: 92 (15 Aug 2022 07:54) (91 - 104)  BP: 121/58 (15 Aug 2022 07:54) (100/64 - 121/58)  BP(mean): --  RR: 18 (15 Aug 2022 07:54) (18 - 19)  SpO2: --    Abdomen:  Soft, nontender, nondistended, no rebound, no guarding.  Extremities:  Nontender calves.                        10.2   10.41 )-----------( 262      ( 13 Aug 2022 12:15 )             31.3     08-12    138  |  104  |  4   ----------------------------<  82  4.0   |  22  |  0.5  0810    136  |  102  |  5   ----------------------------<  83  4.1   |  22  |  0.6    Ca    8.8      12 Aug 2022 07:12  Ca    8.8      10 Aug 2022 22:09    TPro  6.0  /  Alb  3.0  /  TBili  <0.2  /  DBili  x   /  AST  43  /  ALT  65  /  AlkPhos  52  22  TPro  6.5  /  Alb  3.4  /  TBili  <0.2  /  DBili  x   /  AST  43  /  ALT  68  /  AlkPhos  55  08-10-22    Ms. Zaragoza is a 25yo  at 20w5d with cervical insufficiency, s/p physical exam indicated cerclage, POD#3 s/p rescue cerclage for cervical insufficiency POD #3.  She has no complaints.    #cervical insufficiency  - s/p physical exam indicated cerclage, POD#3   - s/p Indomethacin x 48 hours  - s/p Ampicillin/Gentamicin/Clindamycin x 72 hours    Ms. Siu understands that she can develop contractions, rupture of membranes or infection at any time. If she vaginal bleeding, fever ( > 100 F), chills, or abdominal pain or any other concerns she should return to the hospital immediately.  She should ambulate but should not be working for the time being.  She should not have sexual intercourse for the remainder of the pregnancy.  The next pregnancy I recommend a cerclage be placed at around 13 - 14 weeks gestation.  Routine prenatal care is with Dr. Duran.  She should follow up in the high risk office later this week.  Ms. Siu expressed understanding and all of her questions were answered to her satisfaction.    Norberto Avitia MD

## 2022-08-15 NOTE — DISCHARGE NOTE ANTEPARTUM - ADDITIONAL INSTRUCTIONS
Please f/u at your next scheduled prenatal appointment Please f/u at your next scheduled prenatal appointment. Please f/u with MFM on Friday 8/19. We will call with your scheduled appointment time.

## 2022-08-15 NOTE — DISCHARGE NOTE ANTEPARTUM - HOSPITAL COURSE
25yo  @20w5d, s/p rescue cerclage for cervical insufficiency, POD#3. S/p indomethacin and triples for infection ppx. Vitals and labs normal, patient is stable for discharge. 25yo  @20w5d, s/p physical exa indicated cerclage for cervical insufficiency, POD#3. S/p indomethacin and Ampicillin/Gentamicin/Clindamycin. Vital signs stable.  She is stable for discharge. Precautions discussed (please see my note from today for details).  Follow up with Dr. Duran for routine prenatal care.  Follow up in high risk clinic later this week. 25yo  @20w5d, s/p physical exam indicated cerclage for cervical insufficiency, POD#3. S/p indomethacin and Ampicillin/Gentamicin/Clindamycin. Vital signs stable.  She is stable for discharge. Precautions discussed (please see my note from today for details).  Follow up with Dr. Duran for routine prenatal care.  Follow up in high risk clinic later this week.

## 2022-08-15 NOTE — DISCHARGE NOTE ANTEPARTUM - NS MD DC FALL RISK RISK
For information on Fall & Injury Prevention, visit: [email or web address] OR  [email or web address] OR  https://www.cdc.gov/steadi/patient.html

## 2022-08-15 NOTE — DISCHARGE NOTE ANTEPARTUM - MEDICATION SUMMARY - MEDICATIONS TO TAKE
I will START or STAY ON the medications listed below when I get home from the hospital:    Prenatal Multivitamins with Folic Acid 1 mg oral tablet  -- 1 tab(s) by mouth once a day  -- Indication: For pregnancy

## 2022-08-15 NOTE — PROGRESS NOTE ADULT - SUBJECTIVE AND OBJECTIVE BOX
Reason for Admission: s/p rescue cerclage for cervical insuffiuciency    Gestational Age: 20w5d  Hospital Day: 6  POD#3    Patient seen and examined at bedside, no acute complaints. Denies contractions, vaginal bleeding, leakage of fluid. Good fetal movement. Denies fever, chills, nausea, vomiting, abdominal pain, dysuria. Denies chest pain, SOB. Voiding without difficulty, ambulating and tolerating PO.     PAST MEDICAL & SURGICAL HISTORY:  Mild asthma  No h/o hospitalizations or intubations  History of hysteroscopy    Physical Exam:   Vital Signs Last 24 Hrs  T(C): 35.8 (15 Aug 2022 03:20), Max: 36.4 (14 Aug 2022 20:41)  T(F): 96.5 (15 Aug 2022 03:20), Max: 97.5 (14 Aug 2022 20:41)  HR: 91 (15 Aug 2022 03:20) (91 - 104)  BP: 109/51 (15 Aug 2022 03:20) (100/64 - 121/68)  RR: 18 (15 Aug 2022 03:20) (18 - 19)    Gen: AOx3, NAD   Cardio: RRR, S1S2, no m/r/g  Pulm: CTA b/l  Abdomen: soft, gravid, nontender, no palpable contractions   MSK: normal passive and active range of motion   Neuro: CN2-12 intact   Extremities: no swelling or erythema noted   Psych: normal mood and affect, no suicidal or homicidal ideations   BSS:     Labs:                        10.2   10.41 )-----------( 262      ( 13 Aug 2022 12:15 )             31.3                         10.7   11.98 )-----------( 255      ( 12 Aug 2022 07:12 )             32.6      Labs:                        10.7   11.98 )-----------( 255      ( 12 Aug 2022 07:12 )             32.6                         10.4   11.71 )-----------( 237      ( 11 Aug 2022 10:45 )             31.9     138  |  104  |  4  ----------------------------<82  4.0  |  22  |  0.5    Antibody Screen: NEG (08-11-22 @ 11:02)  Antibody Screen: NEG (08-10-22 @ 23:36)    8/10 @2330 12.17>10.7/32.8<270; 136/4.1/102/22/5/0.6<83; AST/ALT 43/63, RPR NR, Opos, COVID neg, UDS neg, GC/CT neg  8/11 11.71>10.4/31.9<237, CRP 17.4 (H), Lactate 1.2  8/12 11.98>10.7/32.6<255, 138/4/104/22/12/4/0.5<82, AST/ALT 43/65, CRP 20.5, lactate 0.7  blood cx NGTD (8/12 PM), UCx negative, GBS negative, acute hepatitis panel - NR    8/10 TVUS: membranes coming through the external os, 3cm dilation  BSS: cephalic, posterior placenta, FH 173bpm, gross fetal movement noted    8/10 Speculum: appeared dilated, bulging membrane  SVE @2037 3/100/-3    8/13 10.41>10.2/31.3<262

## 2022-08-15 NOTE — PROGRESS NOTE ADULT - SUBJECTIVE AND OBJECTIVE BOX
[facility] Staff    Cervical insufficiency    Transabdominal ultrasound:  Variable lie.  fetal heart rate 155 beats per minute.  Deepest vertical pocket 5.44 cm.  Cerclage suture seen.    Norberto Avitia MD

## 2022-08-15 NOTE — DISCHARGE NOTE ANTEPARTUM - PATIENT PORTAL LINK FT
You can access the FollowMyHealth Patient Portal offered by [facility] by registering at the following website: [email or web address]. By joining [employer]’s FollowMyHealth portal, you will also be able to view your health information using other applications (apps) compatible with our system.

## 2022-08-15 NOTE — DISCHARGE NOTE ANTEPARTUM - CARE PROVIDER_API CALL
Carla Soliz (MD)  MaternalFetal Medicine; Obstetrics and Gynecology  [address] [place] [address]  Phone: [phone]  Fax: [phone]  Established Patient  Follow Up Time:

## 2022-08-16 LAB
CULTURE RESULTS: SIGNIFICANT CHANGE UP
SPECIMEN SOURCE: SIGNIFICANT CHANGE UP

## 2022-08-18 PROBLEM — Z00.00 ENCOUNTER FOR PREVENTIVE HEALTH EXAMINATION: Status: ACTIVE | Noted: 2022-08-18

## 2022-08-19 ENCOUNTER — APPOINTMENT (OUTPATIENT)
Dept: ANTEPARTUM | Facility: CLINIC | Age: 27
End: 2022-08-19

## 2022-08-19 ENCOUNTER — ASOB RESULT (OUTPATIENT)
Age: 27
End: 2022-08-19

## 2022-08-19 ENCOUNTER — OUTPATIENT (OUTPATIENT)
Dept: OUTPATIENT SERVICES | Facility: HOSPITAL | Age: 27
LOS: 1 days | Discharge: HOME | End: 2022-08-19

## 2022-08-19 DIAGNOSIS — Z98.890 OTHER SPECIFIED POSTPROCEDURAL STATES: Chronic | ICD-10-CM

## 2022-08-19 PROCEDURE — 99215 OFFICE O/P EST HI 40 MIN: CPT | Mod: 25

## 2022-08-19 PROCEDURE — ZZZZZ: CPT

## 2022-08-19 PROCEDURE — 76817 TRANSVAGINAL US OBSTETRIC: CPT | Mod: 26

## 2022-08-19 PROCEDURE — 76816 OB US FOLLOW-UP PER FETUS: CPT | Mod: 26

## 2022-08-19 NOTE — DISCUSSION/SUMMARY
[FreeTextEntry1] : 22\par MFM f/u Consult Note:\par Ms Siu is a 27yo  at 21w4d, ALEX 2022 by LMP (3/23/2022) c/w early ultrasound  and today's fetal biometry.  She is POD#8 from rescue Valle Cerclage at [facility] performed after 24h of steep Trendelenberg in-hospital bed rest.  She had been referred from Dr Luis Angel braxton's office for "cervix dilated on ultrasound" during routine outpt US. On arrival at [facility] the Cx was 3cm with ballooning membranes.  After bedrest, in the operating room EUA showed Cx 3cm/100%effaced with bulging but not prolapsing membranes.  Rescue cerclage was performed without complication except for a friable right posterior cervical lip which was secured with a 3.0 chromic suture for hemostasis. \par  \par She continues to deny vaginal bleeding and cramping or any other pain. She does not a mucous discharge, at times orange tinged. She denies fever, chills, nausea/vomiting, change in bowel habits, dysuria, severe abdominal pain. \par \par This pregnancy uncomplicated until last week.  She has no history of LEEP, cervical conization, D&C, but did undergo hysteroscopy for removal of uterine  polyp.\par PMHx:  Mild asthma, no Hx hospitalizations or intubations\par             History of hysteroscopy for uterine polyp. \par Allergies:  No Known Allergies\par Obstetric history:  \par GYN history:  No abnormal pap smears, no STDs. During this pregnancy treated for possible bacterial vaginosis. \par                     Hysteroscopy for cervical polyp. \par MEDS:  PNV\par      In hospital for 4 days total:\par             ampicillin  IVPB 2 Gram(s) IV Intermittent every 6 hours\par            gentamycin 80mg IVPB q 8h\par            clindamycin\par            Indomethacin for 48h post op. \par FAMILY HISTORY:  Non contrib. 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  LIves with .  Mother involved and suppportive.  No alcohol use, drug use, or smoking.  \par Review of systems:  See HPI.\par \par Physical exam:  VSS. Afebrile. \par HEENT:  NC/AT.\par Abd: Fundus soft and NT. \par Extr: No CCE. \par SSE today: NEFG, Vag with tan/rare blood streaked mucous.  Cx Appears closed, knot visualized in place. \par SVE: Cx: 1.5cm long/Closed/High. \par \par \par LAB (from last week's admission):  Opos/AntibNeg.  HbEP not found.         \par 8/10    10.7 \par 12.17 )-----------( 270      ( 10 Aug 2022 23:30 )\par            32.8%\par 136  |  102  |  5<L>\par ----------------------------<  83\par 4.1   |  22  |  0.6<L>\par RPR NR; GCCT n/n;  GBSdna Neg. \par Ca    8.8      10 Aug 2022 22:09\par TPro  6.5  /  Alb  3.4<L>  /  TBili  <0.2  /  DBili  x   /  AST  43<H>  /  ALT  68<H>  /  AlkPhos  55  08-10\par Urinalysis Basic - ( 10 Aug 2022 21:53 ) Clear / S.027 / tr ketones, dip neg. \par f/u GC/Cl, Blood cultures, UCx, GBS, UDS\par \par IMAGING:  \par  BSUS: cephalic, posterior placenta, FH 173bpm, gross fetal movement noted\par        TVUS: Large funnel up to external os which appears to be 3mm dilated and completely effaced. \par : [facility] US:  SFVtx, Plac left lateral, DVP 5.5cm, active fetus. EFW 454g at 74th%ile for GA. Membranes reach up to and approx 2mm distal to level of cerclage stitch which is imaged in situ.  Distal to the stitch, the CL is 1.1cm, apparently reconstituted by cerclage. \par : f/u TVUS and Anatomic survey scheduled at [facility]. \par \par ASSESSMENT and RECOMMENDATIONS, discussed with Dr Duran and [facility] team: \par \par 27yo  at 21w4d, GBSdna Neg, 8d s/p Emergency Cerclage for advanced, painless cervical dilation at 20w, presents for f/u TVUS and consult.\par \par 1.  Cervical Insufficiency:  Ms Siu presented with painless dilation, classic for cervical insufficiency.  We have discussed the continued guarded prognosis for this pregnancy.  The slight funneling is not an encouraging sign, on the other hand, the full centimeter of CL after pre-op 100% effacement is somewhat reassuring. \par      I discussed management options with Ms Siu.  She understands that alternatives include expectant management and placement of a reinforcing 2nd stitch.  \par      For now, I have recommended modified bed rest at home over the week-end, and return for TVUS and anatomic survey on  at our [place] office.  We will also repeat CBC and CRP at that time. \par Pt is comfortable with this plan.  \par \par 2.  Pregnancy: \par Anatomic survey . \par Prenatal records from Dr Duran's office.\par \par MD Tito, FACOG.

## 2022-08-22 ENCOUNTER — ASOB RESULT (OUTPATIENT)
Age: 27
End: 2022-08-22

## 2022-08-22 ENCOUNTER — NON-APPOINTMENT (OUTPATIENT)
Age: 27
End: 2022-08-22

## 2022-08-22 ENCOUNTER — APPOINTMENT (OUTPATIENT)
Dept: ANTEPARTUM | Facility: CLINIC | Age: 27
End: 2022-08-22

## 2022-08-22 VITALS
WEIGHT: 217 LBS | HEIGHT: 63 IN | DIASTOLIC BLOOD PRESSURE: 80 MMHG | BODY MASS INDEX: 38.45 KG/M2 | SYSTOLIC BLOOD PRESSURE: 122 MMHG | HEART RATE: 98 BPM

## 2022-08-22 PROCEDURE — 99213 OFFICE O/P EST LOW 20 MIN: CPT | Mod: 25

## 2022-08-22 PROCEDURE — 76817 TRANSVAGINAL US OBSTETRIC: CPT

## 2022-08-22 PROCEDURE — 76811 OB US DETAILED SNGL FETUS: CPT

## 2022-08-22 NOTE — DISCUSSION/SUMMARY
[FreeTextEntry1] : 22\par MFM f/u Consult Note:\par Ms Siu is a 25yo  at 21w4d, ALEX 2022 by LMP (3/23/2022) c/w early ultrasound and today's fetal biometry. She is POD#11 from rescue Valle Cerclage at [facility] on  performed after 24h of steep Trendelenberg in-hospital bed rest. She had been referred from Dr Duran's office for "cervix dilated on ultrasound" during routine outpt US. On arrival at [facility] the Cx was 3cm with ballooning membranes. After bedrest, in the operating room EUA showed Cx 3cm/100%effaced with bulging but not prolapsing membranes. Rescue cerclage was performed without complication except for a friable right posterior cervical lip which was secured with a 3.0 chromic suture for hemostasis. \par  \par She has been on modified bed rest at home for 3d and continues to deny vaginal bleeding and cramping or any other pain. She does note a mucous discharge, no longer orange tinged. She denies fever, chills, nausea/vomiting, change in bowel habits, dysuria, severe abdominal pain. \par \par This pregnancy uncomplicated until 8/10 presentation to Dr HOLCOMB's office. She has no history of LEEP, cervical conization, D&C, but did undergo hysteroscopy for removal of uterine polyp.\par PMHx: Mild asthma, no Hx hospitalizations or intubations\par  History of hysteroscopy for uterine polyp. \par Allergies: No Known Allergies\par Obstetric history: \par GYN history: No abnormal pap smears, no STDs. During this pregnancy treated for possible bacterial vaginosis. \par   Hysteroscopy for cervical polyp. \par MEDS: PNV\par  In hospital for 4 days total:\par  ampicillin IVPB 2 Gram(s) IV Intermittent every 6 hours\par  gentamycin 80mg IVPB q 8h\par  clindamycin\par  Indomethacin for 48h post op. \par FAMILY HISTORY: Non contrib. 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 No alcohol use, drug use, or smoking. \par Review of systems: See HPI.\par \par Px: VS: 122/80; HR 98; 5'3"  217#  BMI 38.  \par HEENT: NC/AT.\par Abd: Fundus soft and NT. \par Extr: No CCE. \par SSE : NEFG, Vag with tan/rare blood streaked mucous. Cx Appears closed, knot visualized in place. \par SVE: Cx: not repeated. \par \par LAB (from last week's admission): Opos/AntibNeg. HbEP not found. \par 8/10 10.7 \par 12.17 )-----------( 270 ( 10 Aug 2022 23:30 )\par  32.8%\par 136 | 102 | 5<L>\par ----------------------------< 83\par 4.1 | 22 | 0.6<L>\par RPR NR; GCCT n/n; GBSdna Neg. \par Ca 8.8 10 Aug 2022 22:09\par TPro 6.5 / Alb 3.4<L> / TBili <0.2 / DBili x / AST 43<H> / ALT 68<H> / AlkPhos 55 08-10\par Urinalysis Basic - ( 10 Aug 2022 21:53 ) Clear / S.027 / tr ketones, dip neg. \par GC/Cl,  UCx, GBS, UDS:  All negative.\par \par IMAGING: \par  BSUS: cephalic, posterior placenta, FH 173bpm, gross fetal movement noted\par  TVUS: Large funnel up to external os which appears to be 3mm dilated and completely effaced. \par : MFM US: SFVtx, Plac left lateral, DVP 5.5cm, active fetus. EFW 454g at 74th%ile for GA. Membranes reach up to and approx 2mm distal to level of cerclage stitch which is imaged in situ. Distal to the stitch, the CL is 1.1cm, apparently reconstituted by cerclage. \par : Anatomic survey limited by maternal body habitus (BMI 38).  \par         SFVtx at 21w5d, plac posterior, not low-lying. AFVol 5.8cm DVP; EFW 476g at 65%ile for GA.  No structural anomalies identified. \par         TVUS:   Cerclage in situ, with funneling up to, but not beyond cercalge.  CL beyond stitch = 1.3cm.  Slightly improved since previous examination 3d ago. \par \par ASSESSMENT and RECOMMENDATIONS, discussed with Dr Duran and [facility] team: \par \par 25yo  at 21w4d, GBSdna Neg, 8d s/p Emergency Cerclage for advanced, painless cervical dilation at 20w, presents for f/u TVUS and anatomic survey.\par \par 1. Cervical Insufficiency: Ms Siu presented with painless dilation, classic for cervical insufficiency. We have discussed the continued guarded prognosis for this pregnancy. The slight funneling observed 3d ago is not imaged at today's exam, and the cervix is slightly longer (1.1cm-->1.3cm).  \par      Ms Siu and I again reviewed management options in light of these somewhat encouraging findings. She understands that for this week only, alternatives include placement of a reinforcing 2nd stitch.  After that we would follow with expectant management and modified bed rest at home. She would like to consider alternatives tonight and will decide tomorrow.  \par For now, I have recommended continued modified bed rest at home.  Pt is comfortable with this plan. \par -->We are considering whether or not to place a 2nd reinforcing cerclage. \par -->If no cerclage, f/u 10-14d with CL.\par \par 2. Pregnancy: Anatomic survey identified to fetal abnormalities, as above. \par     Prenatal records from Dr Duran's office.\par     Prenatal care in collaboration with Dr Duran.  Pt may transfer back after 34w EGA. \par \par MD Tito, FACOG.

## 2022-08-23 DIAGNOSIS — Z3A.21 21 WEEKS GESTATION OF PREGNANCY: ICD-10-CM

## 2022-08-23 DIAGNOSIS — O34.30 MATERNAL CARE FOR CERVICAL INCOMPETENCE, UNSPECIFIED TRIMESTER: ICD-10-CM

## 2022-08-23 DIAGNOSIS — O26.872 CERVICAL SHORTENING, SECOND TRIMESTER: ICD-10-CM

## 2022-08-23 DIAGNOSIS — O34.32 MATERNAL CARE FOR CERVICAL INCOMPETENCE, SECOND TRIMESTER: ICD-10-CM

## 2022-08-26 ENCOUNTER — RESULT CHARGE (OUTPATIENT)
Age: 27
End: 2022-08-26

## 2022-08-26 ENCOUNTER — APPOINTMENT (OUTPATIENT)
Dept: ANTEPARTUM | Facility: CLINIC | Age: 27
End: 2022-08-26

## 2022-08-26 ENCOUNTER — ASOB RESULT (OUTPATIENT)
Age: 27
End: 2022-08-26

## 2022-08-26 ENCOUNTER — OUTPATIENT (OUTPATIENT)
Dept: OUTPATIENT SERVICES | Facility: HOSPITAL | Age: 27
LOS: 1 days | Discharge: HOME | End: 2022-08-26

## 2022-08-26 VITALS
WEIGHT: 217 LBS | OXYGEN SATURATION: 99 % | DIASTOLIC BLOOD PRESSURE: 55 MMHG | BODY MASS INDEX: 38.45 KG/M2 | SYSTOLIC BLOOD PRESSURE: 112 MMHG | TEMPERATURE: 98.4 F | HEIGHT: 63 IN

## 2022-08-26 VITALS — HEART RATE: 100 BPM

## 2022-08-26 DIAGNOSIS — Z98.890 OTHER SPECIFIED POSTPROCEDURAL STATES: Chronic | ICD-10-CM

## 2022-08-26 PROCEDURE — 99214 OFFICE O/P EST MOD 30 MIN: CPT | Mod: 25

## 2022-08-26 PROCEDURE — 76817 TRANSVAGINAL US OBSTETRIC: CPT | Mod: 26

## 2022-08-26 NOTE — DISCUSSION/SUMMARY
[FreeTextEntry1] : Current pregnancy: 27yo  at 22w2d, ALEX 2022 by LMP (3/23/2022) c/w early ultrasound, s/p emergency cerclage on . Co-managed with Dr. Duran. Denies contractions, vaginal bleeding, leakage of fluid. Feeling fetal movement. Patient states that she felt like the baby was kicking her in the vagina. \par ALEX: 22 \par 8/10: Speculum: appeared dilated, bulging membrane \par          TVUS: membranes coming through the external os, 3cm dilation \par          BSS: cephalic, posterior placenta, FH 173bpm, gross fetal movement noted \par : Anatomic survey limited by maternal body habitus (BMI 38).  \par          SFVtx at 21w5d, plac posterior, not low-lying. AFVol 5.8cm DVP; EFW 476g at 65%ile for GA. \par          No structural anomalies identified.  \par \par : Scan today shows: CL 1.1-1.2cm, cerclage in place, membranes appear to be above the cerclage suture, slight funneling is noted; This is relatively unchanged from scan earlier this week. \par \par OBHx: primigravid \par \par GynHx: denies h/o ovarian cysts, fibroids, abnormal pap smears, STIs \par \par PMHx:  \par mild intermittent asthma, denies h/o hospitalizations or intubation \par \par PSHx: \par Hysteroscopy for uterine polyp \par cerclage placement \par \par Meds: PNVs \par \par All: NKDA \par \par SocHx: denies \par   \par Gen: AOx3, NAD\par Cardiac: RRR, S1S2, no m/r/g\par Pulm: CTA b/l\par Abd: soft, gravid, nontender, no palpable ctx\par Ext: no edema, nontender, no erythema or warmth\par Speculum: no abnormal discharge, no fetal parts, cervix appears closed\par SVE: no tension on cerclage, internal os closed\par \par A/P: 27yo  at 22w2d, ALEX 2022 by LMP (3/23/2022) c/w early ultrasound, s/p emergency cerclage on . Co-managed with Dr. Duran. \par \par 1. Cervical Insufficiency: Ms Siu presented with painless dilation, classic for cervical insufficiency. We have discussed the continued guarded prognosis for this pregnancy.  \par Ms Siu and I again reviewed management options in light of these somewhat encouraging findings. \par  --> Continue with expectant management and modified bed rest at home.  \par --> Repeat CL in 10-14d \par \par 2. Pregnancy \par Prenatal records from Dr Duran's office. \par Prenatal care in collaboration with Dr Duran. Pt may transfer back after 34w EGA. \par \par Att'g US Note:\par  Ms Siu presented today alarmed that she perceived "fetal kicking in my vagina". \par Please see my note from  for full Hx. \par \par SSE:  Cerclage in situ, knot at 8 o'clock.  SVE: External os closed. Cx 1cm long. Cerclage palpated, not under tension. \par \par US Findings:\par 1.  A BOONE INTRAUTERINE GESTATION IS NOTED in vertex position.  Sensation of kicking likely due to transient breech position. \par 2.  THE CERVICAL LENGTH IS SHORTENED 1.2 CM WITH CERCLADE IN SITU.  The examination is essentially unchanged since our US of .  \par      Continue bed rest at home. \par RT 2w for CL.  \par RT to L&D for LOF, VB, abdominal pain or cramping.\par Ms Siu was counseled and expressed understanding. \par \par MD Tito

## 2022-08-29 DIAGNOSIS — O26.872 CERVICAL SHORTENING, SECOND TRIMESTER: ICD-10-CM

## 2022-08-29 DIAGNOSIS — O34.30 MATERNAL CARE FOR CERVICAL INCOMPETENCE, UNSPECIFIED TRIMESTER: ICD-10-CM

## 2022-08-29 DIAGNOSIS — Z3A.22 22 WEEKS GESTATION OF PREGNANCY: ICD-10-CM

## 2022-08-29 DIAGNOSIS — O34.32 MATERNAL CARE FOR CERVICAL INCOMPETENCE, SECOND TRIMESTER: ICD-10-CM

## 2022-08-29 DIAGNOSIS — O99.212 OBESITY COMPLICATING PREGNANCY, SECOND TRIMESTER: ICD-10-CM

## 2022-09-09 ENCOUNTER — APPOINTMENT (OUTPATIENT)
Dept: ANTEPARTUM | Facility: CLINIC | Age: 27
End: 2022-09-09

## 2022-09-09 ENCOUNTER — ASOB RESULT (OUTPATIENT)
Age: 27
End: 2022-09-09

## 2022-09-09 VITALS
SYSTOLIC BLOOD PRESSURE: 110 MMHG | HEIGHT: 63 IN | WEIGHT: 217 LBS | BODY MASS INDEX: 38.45 KG/M2 | DIASTOLIC BLOOD PRESSURE: 78 MMHG | HEART RATE: 88 BPM

## 2022-09-09 PROCEDURE — 76817 TRANSVAGINAL US OBSTETRIC: CPT

## 2022-09-09 PROCEDURE — 99212 OFFICE O/P EST SF 10 MIN: CPT | Mod: 25

## 2022-09-20 ENCOUNTER — APPOINTMENT (OUTPATIENT)
Dept: ANTEPARTUM | Facility: CLINIC | Age: 27
End: 2022-09-20

## 2022-09-20 ENCOUNTER — ASOB RESULT (OUTPATIENT)
Age: 27
End: 2022-09-20

## 2022-09-20 VITALS
DIASTOLIC BLOOD PRESSURE: 76 MMHG | HEART RATE: 110 BPM | WEIGHT: 218 LBS | HEIGHT: 63 IN | BODY MASS INDEX: 38.62 KG/M2 | SYSTOLIC BLOOD PRESSURE: 116 MMHG

## 2022-09-20 DIAGNOSIS — Z3A.25 25 WEEKS GESTATION OF PREGNANCY: ICD-10-CM

## 2022-09-20 PROCEDURE — 76815 OB US LIMITED FETUS(S): CPT

## 2022-09-20 PROCEDURE — 99211 OFF/OP EST MAY X REQ PHY/QHP: CPT | Mod: 25

## 2022-09-20 PROCEDURE — 76817 TRANSVAGINAL US OBSTETRIC: CPT

## 2022-09-21 LAB — GLUCOSE 1H P 100 G GLC PO SERPL-MCNC: 140 MG/DL

## 2022-10-04 ENCOUNTER — ASOB RESULT (OUTPATIENT)
Age: 27
End: 2022-10-04

## 2022-10-04 ENCOUNTER — APPOINTMENT (OUTPATIENT)
Dept: ANTEPARTUM | Facility: CLINIC | Age: 27
End: 2022-10-04

## 2022-10-04 VITALS
HEART RATE: 112 BPM | DIASTOLIC BLOOD PRESSURE: 78 MMHG | SYSTOLIC BLOOD PRESSURE: 120 MMHG | HEIGHT: 63 IN | WEIGHT: 221 LBS | BODY MASS INDEX: 39.16 KG/M2

## 2022-10-04 PROCEDURE — 76816 OB US FOLLOW-UP PER FETUS: CPT

## 2022-10-04 PROCEDURE — 76817 TRANSVAGINAL US OBSTETRIC: CPT

## 2022-10-18 ENCOUNTER — ASOB RESULT (OUTPATIENT)
Age: 27
End: 2022-10-18

## 2022-10-18 ENCOUNTER — APPOINTMENT (OUTPATIENT)
Dept: ANTEPARTUM | Facility: CLINIC | Age: 27
End: 2022-10-18

## 2022-10-18 VITALS
HEIGHT: 63 IN | HEART RATE: 98 BPM | DIASTOLIC BLOOD PRESSURE: 75 MMHG | SYSTOLIC BLOOD PRESSURE: 118 MMHG | BODY MASS INDEX: 39.87 KG/M2 | WEIGHT: 225 LBS

## 2022-10-18 PROCEDURE — 76815 OB US LIMITED FETUS(S): CPT

## 2022-10-18 PROCEDURE — 76817 TRANSVAGINAL US OBSTETRIC: CPT

## 2022-11-15 ENCOUNTER — ASOB RESULT (OUTPATIENT)
Age: 27
End: 2022-11-15

## 2022-11-15 ENCOUNTER — APPOINTMENT (OUTPATIENT)
Dept: ANTEPARTUM | Facility: CLINIC | Age: 27
End: 2022-11-15

## 2022-11-15 VITALS
WEIGHT: 234 LBS | HEART RATE: 118 BPM | SYSTOLIC BLOOD PRESSURE: 120 MMHG | DIASTOLIC BLOOD PRESSURE: 78 MMHG | HEIGHT: 63 IN | BODY MASS INDEX: 41.46 KG/M2

## 2022-11-15 PROCEDURE — 76819 FETAL BIOPHYS PROFIL W/O NST: CPT | Mod: 59

## 2022-11-15 PROCEDURE — 76816 OB US FOLLOW-UP PER FETUS: CPT

## 2022-11-22 ENCOUNTER — ASOB RESULT (OUTPATIENT)
Age: 27
End: 2022-11-22

## 2022-11-22 ENCOUNTER — APPOINTMENT (OUTPATIENT)
Dept: ANTEPARTUM | Facility: CLINIC | Age: 27
End: 2022-11-22

## 2022-11-22 VITALS
HEART RATE: 108 BPM | SYSTOLIC BLOOD PRESSURE: 120 MMHG | DIASTOLIC BLOOD PRESSURE: 78 MMHG | WEIGHT: 235 LBS | HEIGHT: 63 IN | BODY MASS INDEX: 41.64 KG/M2

## 2022-11-22 DIAGNOSIS — O99.210 OBESITY COMPLICATING PREGNANCY, UNSPECIFIED TRIMESTER: ICD-10-CM

## 2022-11-22 DIAGNOSIS — O34.30 MATERNAL CARE FOR CERVICAL INCOMPETENCE, UNSPECIFIED TRIMESTER: ICD-10-CM

## 2022-11-22 PROCEDURE — 76819 FETAL BIOPHYS PROFIL W/O NST: CPT

## 2022-12-01 ENCOUNTER — OUTPATIENT (OUTPATIENT)
Dept: OUTPATIENT SERVICES | Facility: HOSPITAL | Age: 27
LOS: 1 days | Discharge: HOME | End: 2022-12-01
Payer: MEDICAID

## 2022-12-01 VITALS — HEART RATE: 115 BPM | SYSTOLIC BLOOD PRESSURE: 131 MMHG | DIASTOLIC BLOOD PRESSURE: 77 MMHG

## 2022-12-01 VITALS — SYSTOLIC BLOOD PRESSURE: 114 MMHG | DIASTOLIC BLOOD PRESSURE: 55 MMHG | HEART RATE: 108 BPM

## 2022-12-01 DIAGNOSIS — Z98.890 OTHER SPECIFIED POSTPROCEDURAL STATES: Chronic | ICD-10-CM

## 2022-12-01 PROCEDURE — 59025 FETAL NON-STRESS TEST: CPT | Mod: 26

## 2022-12-01 PROCEDURE — 99215 OFFICE O/P EST HI 40 MIN: CPT | Mod: 25

## 2022-12-01 PROCEDURE — 99205 OFFICE O/P NEW HI 60 MIN: CPT | Mod: 25

## 2022-12-01 PROCEDURE — 99417 PROLNG OP E/M EACH 15 MIN: CPT | Mod: 25

## 2022-12-01 RX ORDER — ACETAMINOPHEN 500 MG
1000 TABLET ORAL ONCE
Refills: 0 | Status: COMPLETED | OUTPATIENT
Start: 2022-12-01 | End: 2022-12-01

## 2022-12-01 RX ORDER — ONDANSETRON 8 MG/1
4 TABLET, FILM COATED ORAL ONCE
Refills: 0 | Status: DISCONTINUED | OUTPATIENT
Start: 2022-12-01 | End: 2022-12-01

## 2022-12-01 RX ORDER — SODIUM CHLORIDE 9 MG/ML
1000 INJECTION, SOLUTION INTRAVENOUS
Refills: 0 | Status: DISCONTINUED | OUTPATIENT
Start: 2022-12-01 | End: 2022-12-16

## 2022-12-01 RX ORDER — BUTORPHANOL TARTRATE 2 MG/ML
2 INJECTION, SOLUTION INTRAMUSCULAR; INTRAVENOUS ONCE
Refills: 0 | Status: DISCONTINUED | OUTPATIENT
Start: 2022-12-01 | End: 2022-12-01

## 2022-12-01 RX ADMIN — Medication 400 MILLIGRAM(S): at 19:41

## 2022-12-01 RX ADMIN — Medication 1000 MILLIGRAM(S): at 20:00

## 2022-12-01 NOTE — OB PROVIDER TRIAGE NOTE - HISTORY OF PRESENT ILLNESS
Pt is a 27y.o.  @ 36.6wks w/ Cerclage in place for incidental finding of cervical shortening presents c/o low back pain since last night. Pt reports pain was initially mild and she was able to sleep however pain was 10/10 this am, radiating from back to abdomen. Pt reports +clear vaginal discharge, denies VB, dysuria and reports good FM. Pt reports pain lasts x 1 hour and occurs every 3-4 hrs. Pt states pain is now 5/10.     Pt has Leonard cerclage in place since 2022 Pt is a 27y.o.  @ 36.2wks w/ Cerclage in place for incidental finding of cervical shortening presents c/o low back pain since last night. Pt reports pain was initially mild and she was able to sleep however pain was 10/10 this am, radiating from back to abdomen. Pt reports +clear vaginal discharge, denies VB, dysuria and reports good FM. Pt reports pain lasts x 1 hour and occurs every 3-4 hrs. Pt states pain is now 5/10.     Pt has Leonard cerclage in place since 2022

## 2022-12-01 NOTE — OB PROVIDER TRIAGE NOTE - NSOBPROVIDERNOTE_OBGYN_ALL_OB_FT
27y.o.  @ 36.6wks with cerclage in place, r/o labor  - will remove cerclage  - observation to r/o labor  - Continuous efm and toco  - Dr. Duran aware 27y.o.  @ 36.2wks 3cm dilated with cerclage in place, r/o labor  - will remove cerclage  - observation to r/o labor  - Continuous efm and toco  - Dr. Duran aware 27y.o.  @ 36.2wks 3cm dilated with cerclage in place, r/o labor  - will remove cerclage  - observation to r/o labor  - Continuous efm and toco  - Dr. Duran/[facility]  aware    I was physically present for the key portions of the evaluation and management (e/m) service provided. I agree with the above history,physical and plan which I have reviewed and edited where appropriate  Patient seen face to face and case reviewed, precautions given to patient    I personally removed the cerclage with Dr Richard    Patient presented to triage and was evaluated starting 14:54. The fetal heart rate monitor ended 23:01. I spent 487 minutes caring for the patient. It included obtaining a history, performing an examination, continuously monitoring the fetus and interpretation of the fetal heart rate strip, ordering and reviewing labs, documenting in the medical record and a discussion with the patient.    nst reactive

## 2022-12-01 NOTE — OB PROVIDER TRIAGE NOTE - ADDITIONAL INSTRUCTIONS
If you have worsening contractions, vaginal bleeding, leakage of fluid, or don't feel baby move as much as normal, call your doctor or return to Labor and Delivery.

## 2022-12-01 NOTE — OB PROVIDER TRIAGE NOTE - NSICDXFAMHXNEG_GEN_ALL
Dary Found did not attend Reflections group this evening. Remains on 1:1 observation for safety. cancer/hypertension/stroke

## 2022-12-01 NOTE — CHART NOTE - NSCHARTNOTEFT_GEN_A_CORE
PGY 4 Note    Patient reevaluated at bedside, declining pain medication, reports her pain has significantly improved and only endorses some low back discomfort. + fetal movement, no vaginal bleeding or LOF.     SVE@1515 3/80/-2  cerclage removed @1600  SVE@1715 3/80/-2  SVE @1915 3/80/-2     Will discharge home with labor precautions, [facility] instructions. Has appt with PMD tomorrow 12/2.    Discussed with Dr. Duran. PGY 4 Note    Patient reevaluated at bedside, declining pain medication, reports her pain has significantly improved and only endorses some low back discomfort. + fetal movement, no vaginal bleeding or LOF.     SVE@1515 3/80/-2  cerclage removed @1600  SVE@1715 3/80/-2  SVE @1915 3/80/-2     Will discharge home with labor precautions, [facility] instructions. Has appt with PMD tomorrow 12/2.    Discussed with Dr. Duran/Dr Londono

## 2022-12-01 NOTE — OB PROVIDER TRIAGE NOTE - NSICDXPASTMEDICALHX_GEN_ALL_CORE_FT
PAST MEDICAL HISTORY:  Mild asthma No h/o hospitalizations or intubations, exascerbated by seasonal allergies

## 2022-12-01 NOTE — PROGRESS NOTE ADULT - SUBJECTIVE AND OBJECTIVE BOX
Pt evaluated at bedside, still with low back pain 5/10. Pt s/p cerclage removal @ 1615    T(C): 36.7 (22 @ 14:57), Max: 36.7 (22 @ 14:57)  HR: 115 (22 @ 14:57) (115 - 115)  BP: 131/77 (22 @ 14:57) (131/77 - 131/77)  RR: 16 (22 @ 14:57) (16 - 16)    VE: unchanged  3/80/-2  CTx: q 2-3 min  FHR: 130 moderate variability, Cat I    A/P: 27y.o  @ 36.6wks s/p cerclage removal. r/o labor  - IV hydration  - Continuous efm and toco  - Dr. Richard/ Dr. Duran aware Pt evaluated at bedside, still with low back pain 5/10. Pt s/p cerclage removal @ 1615    T(C): 36.7 (22 @ 14:57), Max: 36.7 (22 @ 14:57)  HR: 115 (22 @ 14:57) (115 - 115)  BP: 131/77 (22 @ 14:57) (131/77 - 131/77)  RR: 16 (22 @ 14:57) (16 - 16)    VE: unchanged  3/80/-2  CTx: q 2-3 min  FHR: 130 moderate variability, Cat I    A/P: 27y.o  @ 36.2wks s/p cerclage removal. r/o labor  - IV hydration  - Continuous efm and toco  - Dr. Richard/ Dr. Duran aware Pt evaluated at bedside, still with low back pain 5/10. Pt s/p cerclage removal @ 1615    T(C): 36.7 (22 @ 14:57), Max: 36.7 (22 @ 14:57)  HR: 115 (22 @ 14:57) (115 - 115)  BP: 131/77 (22 @ 14:57) (131/77 - 131/77)  RR: 16 (22 @ 14:57) (16 - 16)    VE: unchanged  3/80/-2  CTx: q 2-3 min  FHR: 130 moderate variability, Cat I    A/P: 27y.o  @ 36.2wks s/p cerclage removal. r/o labor  - IV hydration  - Continuous efm and toco  - Dr. Richard/ Dr. Duran/[facility] aware

## 2022-12-01 NOTE — OB RN TRIAGE NOTE - NS_TRIAGEADDITIONAL COMMENTS_OBGYN_ALL_OB_FT
pt with cerclage c\o back pain 5\10 since last night. denies vaginal bleeding or rom, reports frequent fetal movement. denies fever chills or dysuria.

## 2022-12-01 NOTE — CHART NOTE - NSCHARTNOTEFT_GEN_A_CORE
PGY2 Note    Patient examined at bedside, reports contractions are somewhat worse since presentation. Rates them 7/10. SVE unchanged 3/80/-2    Dr. Duran to be aware

## 2022-12-01 NOTE — OB PROVIDER TRIAGE NOTE - NS_AMNIOTICEVAL_OBGYN_ALL_OB
Use Tylenol every 4 hours or Motrin every 6 hours; you can alternate the 2 medications taking something every 3 hours for pain or fever  You can use over-the-counter antihistamines like Claritin-D 12 hr, Zyrtec-D 12 hr, with Flonase for nasal congestion symptoms  If no improvement follow-up with your doctor in next few days 
Nitrazine Negative/Fern Negative

## 2022-12-01 NOTE — CHART NOTE - NSCHARTNOTEFT_GEN_A_CORE
Patient seen and evaluated at the [facility] for cerclage removal  -speculum placed cerclage noted to be tied at the 6  o clock position, cerclage grasped with long modesto and one side of the knot was cut the remainder of the cerclage was removed    Melissa Hendrix at the bedside

## 2022-12-01 NOTE — OB RN TRIAGE NOTE - FALL HARM RISK - UNIVERSAL INTERVENTIONS
Bed in lowest position, wheels locked, appropriate side rails in place/Call bell, personal items and telephone in reach/Instruct patient to call for assistance before getting out of bed or chair/Non-slip footwear when patient is out of bed/[place] to call system/Physically safe environment - no spills, clutter or unnecessary equipment/Purposeful Proactive Rounding/Room/bathroom lighting operational, light cord in reach

## 2022-12-01 NOTE — OB PROVIDER TRIAGE NOTE - NSHPPHYSICALEXAM_GEN_ALL_CORE
T(C): 36.7 (12-01-22 @ 14:57), Max: 36.7 (12-01-22 @ 14:57)  HR: 115 (12-01-22 @ 14:57) (115 - 115)  BP: 131/77 (12-01-22 @ 14:57) (131/77 - 131/77)  RR: 16 (12-01-22 @ 14:57) (16 - 16)    VE: 3/80/-2  Ctx: irregular q 5-6 min  FHR: 130 moderate variability, Cat I

## 2022-12-01 NOTE — OB PROVIDER TRIAGE NOTE - NSHPLABSRESULTS_GEN_ALL_CORE
Sono @ 35wks S=D,  vtx, post placenta, BPP 8/8  Sono @ 34wks S=D, vtx, post placenta, EFW: 2412g, 5lb5oz 55%, BPP 8/8  Sono @ 30wks vtx, post placenta, CL 1.1cm  Sono @ 28wks S=D, EFW: 1193g, 9al97ns (46%)  CL 1-1.3cm  Sono @ 25.6wks CL 1-1.2cm  Sono @ 21.5wks S-D, nl anatomy, CL 1.3cm    2nd trimester screen negative  Panorama negative    5/4/2022  O Positive  HBSAg negative  RPR nonreactive  Rubella nonimmune  HIV negative

## 2022-12-02 ENCOUNTER — OUTPATIENT (OUTPATIENT)
Dept: OUTPATIENT SERVICES | Facility: HOSPITAL | Age: 27
LOS: 1 days | Discharge: HOME | End: 2022-12-02

## 2022-12-02 ENCOUNTER — APPOINTMENT (OUTPATIENT)
Dept: ANTEPARTUM | Facility: CLINIC | Age: 27
End: 2022-12-02

## 2022-12-02 ENCOUNTER — RESULT CHARGE (OUTPATIENT)
Age: 27
End: 2022-12-02

## 2022-12-02 VITALS
TEMPERATURE: 98 F | SYSTOLIC BLOOD PRESSURE: 138 MMHG | DIASTOLIC BLOOD PRESSURE: 88 MMHG | RESPIRATION RATE: 18 BRPM | HEART RATE: 108 BPM

## 2022-12-02 VITALS — SYSTOLIC BLOOD PRESSURE: 139 MMHG | DIASTOLIC BLOOD PRESSURE: 82 MMHG | HEART RATE: 99 BPM

## 2022-12-02 VITALS — HEART RATE: 112 BPM | DIASTOLIC BLOOD PRESSURE: 58 MMHG | SYSTOLIC BLOOD PRESSURE: 118 MMHG

## 2022-12-02 VITALS — SYSTOLIC BLOOD PRESSURE: 137 MMHG | DIASTOLIC BLOOD PRESSURE: 69 MMHG | HEART RATE: 120 BPM

## 2022-12-02 VITALS
DIASTOLIC BLOOD PRESSURE: 79 MMHG | BODY MASS INDEX: 42.35 KG/M2 | HEIGHT: 63 IN | OXYGEN SATURATION: 99 % | WEIGHT: 239 LBS | HEART RATE: 105 BPM | TEMPERATURE: 98.4 F | SYSTOLIC BLOOD PRESSURE: 132 MMHG

## 2022-12-02 DIAGNOSIS — Z98.890 OTHER SPECIFIED POSTPROCEDURAL STATES: Chronic | ICD-10-CM

## 2022-12-02 LAB
ALBUMIN SERPL ELPH-MCNC: 3.4 G/DL — LOW (ref 3.5–5.2)
ALP SERPL-CCNC: 123 U/L — HIGH (ref 30–115)
ALT FLD-CCNC: 14 U/L — SIGNIFICANT CHANGE UP (ref 0–41)
ANION GAP SERPL CALC-SCNC: 14 MMOL/L — SIGNIFICANT CHANGE UP (ref 7–14)
APPEARANCE UR: CLEAR — SIGNIFICANT CHANGE UP
APTT BLD: 29.2 SEC — SIGNIFICANT CHANGE UP (ref 27–39.2)
AST SERPL-CCNC: 31 U/L — SIGNIFICANT CHANGE UP (ref 0–41)
BACTERIA # UR AUTO: NEGATIVE — SIGNIFICANT CHANGE UP
BASOPHILS # BLD AUTO: 0.01 K/UL — SIGNIFICANT CHANGE UP (ref 0–0.2)
BASOPHILS NFR BLD AUTO: 0.1 % — SIGNIFICANT CHANGE UP (ref 0–1)
BILIRUB SERPL-MCNC: 0.3 MG/DL — SIGNIFICANT CHANGE UP (ref 0.2–1.2)
BILIRUB UR-MCNC: NEGATIVE — SIGNIFICANT CHANGE UP
BUN SERPL-MCNC: 4 MG/DL — LOW (ref 10–20)
CALCIUM SERPL-MCNC: 8.9 MG/DL — SIGNIFICANT CHANGE UP (ref 8.4–10.5)
CHLORIDE SERPL-SCNC: 101 MMOL/L — SIGNIFICANT CHANGE UP (ref 98–110)
CO2 SERPL-SCNC: 21 MMOL/L — SIGNIFICANT CHANGE UP (ref 17–32)
COLOR SPEC: SIGNIFICANT CHANGE UP
CREAT SERPL-MCNC: 0.5 MG/DL — LOW (ref 0.7–1.5)
DIFF PNL FLD: ABNORMAL
EGFR: 131 ML/MIN/1.73M2 — SIGNIFICANT CHANGE UP
EOSINOPHIL # BLD AUTO: 0.04 K/UL — SIGNIFICANT CHANGE UP (ref 0–0.7)
EOSINOPHIL NFR BLD AUTO: 0.4 % — SIGNIFICANT CHANGE UP (ref 0–8)
EPI CELLS # UR: 3 /HPF — SIGNIFICANT CHANGE UP (ref 0–5)
FIBRINOGEN PPP-MCNC: >700 MG/DL — HIGH (ref 204.4–570.6)
GLUCOSE SERPL-MCNC: 81 MG/DL — SIGNIFICANT CHANGE UP (ref 70–99)
GLUCOSE UR QL: NEGATIVE — SIGNIFICANT CHANGE UP
HCT VFR BLD CALC: 34.9 % — LOW (ref 37–47)
HGB BLD-MCNC: 10.9 G/DL — LOW (ref 12–16)
HYALINE CASTS # UR AUTO: 0 /LPF — SIGNIFICANT CHANGE UP (ref 0–7)
IMM GRANULOCYTES NFR BLD AUTO: 0.4 % — HIGH (ref 0.1–0.3)
INR BLD: 1.25 RATIO — SIGNIFICANT CHANGE UP (ref 0.65–1.3)
KETONES UR-MCNC: SIGNIFICANT CHANGE UP
LDH SERPL L TO P-CCNC: 396 — HIGH (ref 50–242)
LEUKOCYTE ESTERASE UR-ACNC: NEGATIVE — SIGNIFICANT CHANGE UP
LYMPHOCYTES # BLD AUTO: 18.6 % — LOW (ref 20.5–51.1)
LYMPHOCYTES # BLD AUTO: 2.11 K/UL — SIGNIFICANT CHANGE UP (ref 1.2–3.4)
MCHC RBC-ENTMCNC: 26.3 PG — LOW (ref 27–31)
MCHC RBC-ENTMCNC: 31.2 G/DL — LOW (ref 32–37)
MCV RBC AUTO: 84.3 FL — SIGNIFICANT CHANGE UP (ref 81–99)
MONOCYTES # BLD AUTO: 0.73 K/UL — HIGH (ref 0.1–0.6)
MONOCYTES NFR BLD AUTO: 6.4 % — SIGNIFICANT CHANGE UP (ref 1.7–9.3)
NEUTROPHILS # BLD AUTO: 8.43 K/UL — HIGH (ref 1.4–6.5)
NEUTROPHILS NFR BLD AUTO: 74.1 % — SIGNIFICANT CHANGE UP (ref 42.2–75.2)
NITRITE UR-MCNC: NEGATIVE — SIGNIFICANT CHANGE UP
NRBC # BLD: 0 /100 WBCS — SIGNIFICANT CHANGE UP (ref 0–0)
PH UR: 6.5 — SIGNIFICANT CHANGE UP (ref 5–8)
PLATELET # BLD AUTO: 200 K/UL — SIGNIFICANT CHANGE UP (ref 130–400)
POTASSIUM SERPL-MCNC: 4.1 MMOL/L — SIGNIFICANT CHANGE UP (ref 3.5–5)
POTASSIUM SERPL-SCNC: 4.1 MMOL/L — SIGNIFICANT CHANGE UP (ref 3.5–5)
PROT SERPL-MCNC: 6.7 G/DL — SIGNIFICANT CHANGE UP (ref 6–8)
PROT UR-MCNC: NEGATIVE — SIGNIFICANT CHANGE UP
PROTHROM AB SERPL-ACNC: 14.3 SEC — HIGH (ref 9.95–12.87)
RBC # BLD: 4.14 M/UL — LOW (ref 4.2–5.4)
RBC # FLD: 17.6 % — HIGH (ref 11.5–14.5)
RBC CASTS # UR COMP ASSIST: 4 /HPF — SIGNIFICANT CHANGE UP (ref 0–4)
SODIUM SERPL-SCNC: 136 MMOL/L — SIGNIFICANT CHANGE UP (ref 135–146)
SP GR SPEC: 1.01 — SIGNIFICANT CHANGE UP (ref 1.01–1.03)
URATE SERPL-MCNC: 4.2 MG/DL — SIGNIFICANT CHANGE UP (ref 2.5–7)
UROBILINOGEN FLD QL: SIGNIFICANT CHANGE UP
WBC # BLD: 11.37 K/UL — HIGH (ref 4.8–10.8)
WBC # FLD AUTO: 11.37 K/UL — HIGH (ref 4.8–10.8)
WBC UR QL: 1 /HPF — SIGNIFICANT CHANGE UP (ref 0–5)

## 2022-12-02 PROCEDURE — 99214 OFFICE O/P EST MOD 30 MIN: CPT

## 2022-12-02 PROCEDURE — 76705 ECHO EXAM OF ABDOMEN: CPT | Mod: 26

## 2022-12-02 RX ORDER — SIMETHICONE 80 MG/1
80 TABLET, CHEWABLE ORAL ONCE
Refills: 0 | Status: COMPLETED | OUTPATIENT
Start: 2022-12-02 | End: 2022-12-02

## 2022-12-02 RX ORDER — SODIUM CHLORIDE 9 MG/ML
1000 INJECTION, SOLUTION INTRAVENOUS
Refills: 0 | Status: DISCONTINUED | OUTPATIENT
Start: 2022-12-02 | End: 2022-12-16

## 2022-12-02 RX ORDER — ONDANSETRON 8 MG/1
4 TABLET, FILM COATED ORAL ONCE
Refills: 0 | Status: COMPLETED | OUTPATIENT
Start: 2022-12-02 | End: 2022-12-02

## 2022-12-02 RX ORDER — DIPHENHYDRAMINE HCL 50 MG
25 CAPSULE ORAL ONCE
Refills: 0 | Status: COMPLETED | OUTPATIENT
Start: 2022-12-02 | End: 2022-12-02

## 2022-12-02 RX ORDER — BUTORPHANOL TARTRATE 2 MG/ML
2 INJECTION, SOLUTION INTRAMUSCULAR; INTRAVENOUS ONCE
Refills: 0 | Status: DISCONTINUED | OUTPATIENT
Start: 2022-12-02 | End: 2022-12-02

## 2022-12-02 RX ORDER — ACETAMINOPHEN 500 MG
1000 TABLET ORAL ONCE
Refills: 0 | Status: COMPLETED | OUTPATIENT
Start: 2022-12-02 | End: 2022-12-02

## 2022-12-02 RX ORDER — SODIUM CHLORIDE 9 MG/ML
1000 INJECTION, SOLUTION INTRAVENOUS
Refills: 0 | Status: DISCONTINUED | OUTPATIENT
Start: 2022-12-02 | End: 2022-12-17

## 2022-12-02 RX ADMIN — SIMETHICONE 80 MILLIGRAM(S): 80 TABLET, CHEWABLE ORAL at 20:21

## 2022-12-02 RX ADMIN — Medication 25 MILLIGRAM(S): at 04:03

## 2022-12-02 RX ADMIN — Medication 400 MILLIGRAM(S): at 20:22

## 2022-12-02 RX ADMIN — ONDANSETRON 4 MILLIGRAM(S): 8 TABLET, FILM COATED ORAL at 20:21

## 2022-12-02 RX ADMIN — Medication 10 MILLIGRAM(S): at 20:21

## 2022-12-02 RX ADMIN — BUTORPHANOL TARTRATE 2 MILLIGRAM(S): 2 INJECTION, SOLUTION INTRAMUSCULAR; INTRAVENOUS at 04:05

## 2022-12-02 NOTE — OB PROVIDER TRIAGE NOTE - HISTORY OF PRESENT ILLNESS
Pt is a 27y.o.  @ 36.3wks, ALEX 23, re-presents to L&D for lower back and abdominal pain. Pt was seen yesterday in L&D twice for similar complaint. Pt Initially presented for lower back pain and had Valle cerclage removed that had been in place since 22 for incidentally found shortened cervix. Pt re-presented for continued low back pain and received therapeutic rest, discharged in stable condition with unchanged cervical exam of 3/80/-2. Pt says since going home, her pain has persisted and increased, now 9/10. She reports she feels like she is [place], but also had an episode of 2-3 hours of constant central abdominal pain, non-radiating. She reports vomiting x4 after attempting to eat, most recently at 4pm. She reports her last BM was yesterday morning, though she usually has 2x bowel movements/day. Denies any fevers, chills, nausea, SOB, CP, feelings of constipation or diarrhea, urinary symptoms. Reports good fetal movement, denies LOF or VB. GBS unknown.

## 2022-12-02 NOTE — OB RN TRIAGE NOTE - FALL HARM RISK - UNIVERSAL INTERVENTIONS
Bed in lowest position, wheels locked, appropriate side rails in place/Call bell, personal items and telephone in reach/Instruct patient to call for assistance before getting out of bed or chair/Non-slip footwear when patient is out of bed/Clear to call system/Physically safe environment - no spills, clutter or unnecessary equipment/Purposeful Proactive Rounding/Room/bathroom lighting operational, light cord in reach

## 2022-12-02 NOTE — OB PROVIDER TRIAGE NOTE - NSOBPROVIDERNOTE_OBGYN_ALL_OB_FT
27y.o.  @ 36.3wks, GBS unknown, 3cm dilated, s/p cerclage removal, with continue back and lower abdominal pain, r/o labor, r/o GI illness, with elevated BP r/o gHTN vs. Preeclampsia,   - observation to r/o labor  - cont efm/toco  - IV tylenol for pain  - Rectal suppository   - IV zofran  - RUQ sonogram  - IV fluid hydration  - PELs, UA, Urprcr  - Vitals q15m    Dr. De La Cruz aware, Dr. Duran aware 27y.o.  @ 36.3wks, GBS unknown, 3cm dilated, s/p cerclage removal, with continue back and lower abdominal pain, r/o labor, r/o GI illness, with elevated BP r/o gHTN vs. Preeclampsia,   - observation to r/o labor  - cont efm/toco  - IV tylenol for pain  - Rectal suppository   - IV zofran  - RUQ sonogram  - IV fluid hydration  - PELs, UA, Urprcr  - Vitals q15m    Dr. De La Cruz aware, Dr. Duran aware    ADDENDUM    Pt reevaluated after close monitoring. Pt reports pain improved with IV tylenol. Reports belching but no flatus or BM. Reports feeling overall better. RUQ negative, Labs wnl, SVE unchanged 3/80/-2. Discussed with pt expectations for back pain in pregnancy and constipation. Pt voiced understanding and willing to try interventions such as tylenol PRN, miralax, simethicone and a belly band.     -discharge home  -PO hydration encouraged  -labor precautions discussed  -pain management PRN with tylenol, belly band  -Miralax and simethicone for constipation/gas  -f/u for scheduled prenatal visit    Dr. De La Cruz and Dr. Roger tom

## 2022-12-02 NOTE — OB PROVIDER TRIAGE NOTE - NSHPLABSRESULTS_GEN_ALL_CORE
Sono @ 35wks S=D,  vtx, post placenta, BPP 8/8  Sono @ 34wks S=D, vtx, post placenta, EFW: 2412g, 5lb5oz 55%, BPP 8/8  Sono @ 30wks vtx, post placenta, CL 1.1cm  Sono @ 28wks S=D, EFW: 1193g, 6dy69ih (46%)  CL 1-1.3cm  Sono @ 25.6wks CL 1-1.2cm  Sono @ 21.5wks S-D, nl anatomy, CL 1.3cm    2nd trimester screen negative  Panorama negative    5/4/2022  O Positive  HBSAg negative  RPR nonreactive  Rubella nonimmune  HIV negative

## 2022-12-02 NOTE — OB PROVIDER TRIAGE NOTE - NSHPPHYSICALEXAM_GEN_ALL_CORE
Vital Signs Last 24 Hrs  T(C): 36.8 (02 Dec 2022 03:01), Max: 36.8 (02 Dec 2022 03:01)  T(F): 98.2 (02 Dec 2022 03:01), Max: 98.2 (02 Dec 2022 03:01)  HR: 108 (02 Dec 2022 03:01) (108 - 115)  BP: 138/88 (02 Dec 2022 03:01) (114/55 - 138/88)  RR: 18 (02 Dec 2022 03:01) (16 - 18)  Parameters below as of 02 Dec 2022 03:01  Patient On (Oxygen Delivery Method): room air    Gen: NAD, sitting uncomfortably  Abd: Gravid, soft, NT, mildly palpable ctx  SVE: 3/80/-2 ,vtx, intact  EFM: 135/mod/+accels - cat 1  [place]: uterine irritability

## 2022-12-02 NOTE — OB PROVIDER TRIAGE NOTE - ADDITIONAL INSTRUCTIONS
If you notice bright red blood enough to soak a pad, a large gush of fluid or continuous leakage of fluid, or decreased fetal movement please come to labor and delivery.  Fetal kick counts should be monitored, and 5-6 kicks should be felt per hour.  If less, please call your doctor.  Please follow up at your regularly scheduled prenatal appointment. For pain, it is safe to take tylenol in pregnancy, up to 3000mg in 24 hours (1 dose every 6 hours). Use simethicone and miralax for constipation and gas. Use a belly bend to help relieve back pain.

## 2022-12-02 NOTE — OB PROVIDER TRIAGE NOTE - NSHPPHYSICALEXAM_GEN_ALL_CORE
Vital Signs Last 24 Hrs  T(C): 37.1 (02 Dec 2022 19:13), Max: 37.1 (02 Dec 2022 19:13)  T(F): 98.7 (02 Dec 2022 19:13), Max: 98.7 (02 Dec 2022 19:13)  HR: 105 (02 Dec 2022 19:39) (90 - 120)  BP: 135/88 (02 Dec 2022 19:39) (114/55 - 146/74)  RR: 17 (02 Dec 2022 19:13) (17 - 18)    Gen: NAD, sitting uncomfortably  Abd: Gravid, soft, NT, no palpable ctx  SVE: 3/80/-2, vtx, intact  EFM: 140/mod/+accels  [place]: none  Sono: cephalic, BPP 8/8, MVP 7.5

## 2022-12-02 NOTE — OB PROVIDER TRIAGE NOTE - NSOBPROVIDERNOTE_OBGYN_ALL_OB_FT
27y.o.  @ 36.3wks, GBS unknown, 3cm dilated, s/p cerclage removal, with continue back and lower abdominal pain, r/o labor, pain management    - observation to r/o labor  - Continuous efm and toco  - therapeutic rest with stadol/benadryl  - IV fluid hydration    Dr. De La Cruz aware, Dr. Duran aware 27y.o.  @ 36.3wks, GBS unknown, 3cm dilated, s/p cerclage removal, with continue back and lower abdominal pain, r/o labor, pain management    - observation to r/o labor  - Continuous efm and toco  - therapeutic rest with stadol/benadryl  - IV fluid hydration    Dr. De La Cruz aware, Dr. Duran aware    Addendum: 27y.o.  @ 36.3wks, GBS unknown, 3cm dilated, s/p cerclage removal, with continue back and lower abdominal pain, r/o labor, pain management    - observation to r/o labor  - Continuous efm and toco  - therapeutic rest with stadol/benadryl  - IV fluid hydration    Dr. De La Cruz aware, Dr. Roger tom    Addendum:  Patient states she is feeling better s/p therapeutic rest. She has high risk appointment today in an hour and is eager to go.    - Discharge home  - PO hydration recommended  -  labor precautions given  - Kick count instructions discussed  - Follow up with PMD as scheduled    Dr. Roger tom

## 2022-12-02 NOTE — OB PROVIDER TRIAGE NOTE - NSHPLABSRESULTS_GEN_ALL_CORE
Sono @ 35wks S=D,  vtx, post placenta, BPP 8/8  Sono @ 34wks S=D, vtx, post placenta, EFW: 2412g, 5lb5oz 55%, BPP 8/8  Sono @ 30wks vtx, post placenta, CL 1.1cm  Sono @ 28wks S=D, EFW: 1193g, [id number] (46%)  CL 1-1.3cm  Sono @ 25.6wks CL 1-1.2cm  Sono @ 21.5wks S-D, nl anatomy, CL 1.3cm    2nd trimester screen negative  Panorama negative    5/4/2022  O Positive  HBSAg negative  RPR nonreactive  Rubella nonimmune  HIV negative

## 2022-12-02 NOTE — OB PROVIDER TRIAGE NOTE - HISTORY OF PRESENT ILLNESS
Pt is a 27y.o.  @ 36.3wks, s/p cerclage removal c/o worsening lower back pain and now lower abdominal pain. Pt was seen at L&D yesterday for c/o low back pain and had [place] cerclage removed which had been placed on 2022 for incidental finding of shortened cervix. Pt was discharged in stable condition following evaluation and prolonged monitoring where SVE remained unchanged at 3/80/-2. Pt reports that she returned home, took a shower, tried to lie down, but then the pain worsened from 4/10 to 10/10 pain. She says the pain is now constant 8/10 and worsens to 10/10 intermittently and requests pain control. She reports good FM, denies LOF or VB. GBS unknown.

## 2022-12-02 NOTE — CHART NOTE - NSCHARTNOTEFT_GEN_A_CORE
PGY 2 Note    At patient bedside for reevaluation after stadol. Reports pain medication provided some relief but reports it has now returned. Reports 7/10 left lower back pain radiating to her groin. [place] irregularly. SVE: 3/80/-3, vtx, intact.     Dr De La Cruz and Dr Duran to be made aware

## 2022-12-03 ENCOUNTER — OUTPATIENT (OUTPATIENT)
Dept: OUTPATIENT SERVICES | Facility: HOSPITAL | Age: 27
LOS: 1 days | Discharge: HOME | End: 2022-12-03

## 2022-12-03 VITALS — DIASTOLIC BLOOD PRESSURE: 82 MMHG | SYSTOLIC BLOOD PRESSURE: 141 MMHG | HEART RATE: 106 BPM

## 2022-12-03 VITALS — HEART RATE: 95 BPM | DIASTOLIC BLOOD PRESSURE: 67 MMHG | SYSTOLIC BLOOD PRESSURE: 128 MMHG

## 2022-12-03 DIAGNOSIS — O47.1 FALSE LABOR AT OR AFTER 37 COMPLETED WEEKS OF GESTATION: ICD-10-CM

## 2022-12-03 DIAGNOSIS — O26.893 OTHER SPECIFIED PREGNANCY RELATED CONDITIONS, THIRD TRIMESTER: ICD-10-CM

## 2022-12-03 DIAGNOSIS — Z98.890 OTHER SPECIFIED POSTPROCEDURAL STATES: Chronic | ICD-10-CM

## 2022-12-03 LAB
ALBUMIN SERPL ELPH-MCNC: 3.7 G/DL — SIGNIFICANT CHANGE UP (ref 3.5–5.2)
ALP SERPL-CCNC: 125 U/L — HIGH (ref 30–115)
ALT FLD-CCNC: 14 U/L — SIGNIFICANT CHANGE UP (ref 0–41)
ANION GAP SERPL CALC-SCNC: 15 MMOL/L — HIGH (ref 7–14)
APPEARANCE UR: ABNORMAL
APTT BLD: 28 SEC — SIGNIFICANT CHANGE UP (ref 27–39.2)
AST SERPL-CCNC: 21 U/L — SIGNIFICANT CHANGE UP (ref 0–41)
BACTERIA # UR AUTO: NEGATIVE — SIGNIFICANT CHANGE UP
BASOPHILS # BLD AUTO: 0.02 K/UL — SIGNIFICANT CHANGE UP (ref 0–0.2)
BASOPHILS NFR BLD AUTO: 0.2 % — SIGNIFICANT CHANGE UP (ref 0–1)
BILIRUB SERPL-MCNC: 0.5 MG/DL — SIGNIFICANT CHANGE UP (ref 0.2–1.2)
BILIRUB UR-MCNC: NEGATIVE — SIGNIFICANT CHANGE UP
BLD GP AB SCN SERPL QL: SIGNIFICANT CHANGE UP
BUN SERPL-MCNC: 4 MG/DL — LOW (ref 10–20)
CALCIUM SERPL-MCNC: 9.2 MG/DL — SIGNIFICANT CHANGE UP (ref 8.4–10.4)
CHLORIDE SERPL-SCNC: 102 MMOL/L — SIGNIFICANT CHANGE UP (ref 98–110)
CO2 SERPL-SCNC: 22 MMOL/L — SIGNIFICANT CHANGE UP (ref 17–32)
COLOR SPEC: YELLOW — SIGNIFICANT CHANGE UP
CREAT SERPL-MCNC: 0.6 MG/DL — LOW (ref 0.7–1.5)
CULTURE RESULTS: SIGNIFICANT CHANGE UP
DIFF PNL FLD: ABNORMAL
EGFR: 125 ML/MIN/1.73M2 — SIGNIFICANT CHANGE UP
EOSINOPHIL # BLD AUTO: 0.04 K/UL — SIGNIFICANT CHANGE UP (ref 0–0.7)
EOSINOPHIL NFR BLD AUTO: 0.4 % — SIGNIFICANT CHANGE UP (ref 0–8)
EPI CELLS # UR: 7 /HPF — HIGH (ref 0–5)
FIBRINOGEN PPP-MCNC: >700 MG/DL — HIGH (ref 204.4–570.6)
GLUCOSE SERPL-MCNC: 80 MG/DL — SIGNIFICANT CHANGE UP (ref 70–99)
GLUCOSE UR QL: NEGATIVE — SIGNIFICANT CHANGE UP
HCT VFR BLD CALC: 33.8 % — LOW (ref 37–47)
HGB BLD-MCNC: 10.7 G/DL — LOW (ref 12–16)
HYALINE CASTS # UR AUTO: 3 /LPF — SIGNIFICANT CHANGE UP (ref 0–7)
IMM GRANULOCYTES NFR BLD AUTO: 0.5 % — HIGH (ref 0.1–0.3)
INR BLD: 1.26 RATIO — SIGNIFICANT CHANGE UP (ref 0.65–1.3)
KETONES UR-MCNC: ABNORMAL
LDH SERPL L TO P-CCNC: 217 — SIGNIFICANT CHANGE UP (ref 50–242)
LEUKOCYTE ESTERASE UR-ACNC: NEGATIVE — SIGNIFICANT CHANGE UP
LYMPHOCYTES # BLD AUTO: 2.27 K/UL — SIGNIFICANT CHANGE UP (ref 1.2–3.4)
LYMPHOCYTES # BLD AUTO: 20.6 % — SIGNIFICANT CHANGE UP (ref 20.5–51.1)
MCHC RBC-ENTMCNC: 26.7 PG — LOW (ref 27–31)
MCHC RBC-ENTMCNC: 31.7 G/DL — LOW (ref 32–37)
MCV RBC AUTO: 84.3 FL — SIGNIFICANT CHANGE UP (ref 81–99)
MONOCYTES # BLD AUTO: 0.67 K/UL — HIGH (ref 0.1–0.6)
MONOCYTES NFR BLD AUTO: 6.1 % — SIGNIFICANT CHANGE UP (ref 1.7–9.3)
NEUTROPHILS # BLD AUTO: 7.97 K/UL — HIGH (ref 1.4–6.5)
NEUTROPHILS NFR BLD AUTO: 72.2 % — SIGNIFICANT CHANGE UP (ref 42.2–75.2)
NITRITE UR-MCNC: NEGATIVE — SIGNIFICANT CHANGE UP
NRBC # BLD: 0 /100 WBCS — SIGNIFICANT CHANGE UP (ref 0–0)
PH UR: 6 — SIGNIFICANT CHANGE UP (ref 5–8)
PLATELET # BLD AUTO: 195 K/UL — SIGNIFICANT CHANGE UP (ref 130–400)
POTASSIUM SERPL-MCNC: 3.7 MMOL/L — SIGNIFICANT CHANGE UP (ref 3.5–5)
POTASSIUM SERPL-SCNC: 3.7 MMOL/L — SIGNIFICANT CHANGE UP (ref 3.5–5)
PRENATAL SYPHILIS TEST: SIGNIFICANT CHANGE UP
PROT SERPL-MCNC: 6.8 G/DL — SIGNIFICANT CHANGE UP (ref 6–8)
PROT UR-MCNC: SIGNIFICANT CHANGE UP
PROTHROM AB SERPL-ACNC: 14.5 SEC — HIGH (ref 9.95–12.87)
RBC # BLD: 4.01 M/UL — LOW (ref 4.2–5.4)
RBC # FLD: 17.6 % — HIGH (ref 11.5–14.5)
RBC CASTS # UR COMP ASSIST: 6 /HPF — HIGH (ref 0–4)
SODIUM SERPL-SCNC: 139 MMOL/L — SIGNIFICANT CHANGE UP (ref 135–146)
SP GR SPEC: 1.02 — SIGNIFICANT CHANGE UP (ref 1.01–1.03)
SPECIMEN SOURCE: SIGNIFICANT CHANGE UP
URATE SERPL-MCNC: 5.2 MG/DL — SIGNIFICANT CHANGE UP (ref 2.5–7)
UROBILINOGEN FLD QL: SIGNIFICANT CHANGE UP
WBC # BLD: 11.02 K/UL — HIGH (ref 4.8–10.8)
WBC # FLD AUTO: 11.02 K/UL — HIGH (ref 4.8–10.8)
WBC UR QL: 5 /HPF — SIGNIFICANT CHANGE UP (ref 0–5)

## 2022-12-03 RX ORDER — METOCLOPRAMIDE 10 MG/1
10 TABLET ORAL 3 TIMES DAILY
Qty: 42 | Refills: 0 | Status: ACTIVE | COMMUNITY
Start: 2022-12-03 | End: 1900-01-01

## 2022-12-03 NOTE — OB PROVIDER TRIAGE NOTE - NSHPLABSRESULTS_GEN_ALL_CORE
Sono @ 35wks S=D,  vtx, post placenta, BPP 8/8  Sono @ 34wks S=D, vtx, post placenta, EFW: 2412g, 5lb5oz 55%, BPP 8/8  Sono @ 30wks vtx, post placenta, CL 1.1cm  Sono @ 28wks S=D, EFW: 1193g, [id number] (46%)  CL 1-1.3cm  Sono @ 25.6wks CL 1-1.2cm  Sono @ 21.5wks S-D, nl anatomy, CL 1.3cm    2nd trimester screen negative  Panorama negative    5/4/2022  O Positive  HBSAg negative  RPR nonreactive  Rubella nonimmune  HIV negative

## 2022-12-03 NOTE — OB PROVIDER TRIAGE NOTE - ADDITIONAL INSTRUCTIONS
Return to hospital if you experience painful regular contractions, leakage of vaginal fluid, vaginal bleeding, or decreased fetal movement. Return to hospital if you experience headaches not relieved by Tylenol, changes in vision, shortness of breath/chest pain.

## 2022-12-03 NOTE — OB PROVIDER TRIAGE NOTE - HISTORY OF PRESENT ILLNESS
26yo , @36w4d, ALEX 22, s/p cerclage, presenting to L&D for lower back and abdominal pain.  Patient seen over the past two days on L&D for similar complaints despite therapeutic rest on L&D. Reports the pain is mainly in lower back and lower abdomen, 8/10, constant for the past 2 hours. Patient also reports multiple episodes of emesis today, not able to keep any food down since Thursday.  Last BM was Thursday.  Also reporting 3 episodes of thick mucus vaginal discharge today, denies leakage of thin watery fluid or vaginal bleeding. + FM. GBS unknown    Denies fevers, headaches, shortness of breath, chest pain, new onset lower extremity pain.

## 2022-12-03 NOTE — OB PROVIDER TRIAGE NOTE - NSOBPROVIDERNOTE_OBGYN_ALL_OB_FT
27y , @36w4d, GBS unknown, s/p cerclage, r/o gHTN vs preeclampsia, r/o labor     - f/u PELs, vaginitis, GBS, admission labs   - cont to monitor vitals, bps q15min   - cont toco/efm   - IV hydration    Dr. pedraza and dr. stoll to be made aware 27y , @36w4d, GBS unknown, s/p cerclage, r/o gHTN vs preeclampsia, r/o labor     - f/u PELs, vaginitis, GBS, admission labs   - cont to monitor vitals, bps q15min   - cont toco/efm   - IV hydration  - pain mgmt prn     Dr. pedraza and dr. stoll to be made aware 27y , @36w4d, GBS unknown, s/p cerclage, r/o gHTN vs preeclampsia, r/o labor     - f/u PELs, vaginitis, GBS, admission labs   - cont to monitor vitals, bps q15min   - cont toco/efm   - IV hydration  - pain mgmt prn     Dr. pedraza and dr. stoll to be made aware      Patient monitored in L&D for >4 hours, BP range 128-153/63-88, did not meet criteria for gHTN.  SVE after 4 hours remained unchanged at 4/80/-3.  Patient reported relief of back pain and nausea.   Patient desires to go home. Reassuring maternal and fetal status.   - discharge patient home  - labor precautions discussed, patient vocalized understanding, all questions answered  - preeclampsia precautions given

## 2022-12-03 NOTE — OB PROVIDER TRIAGE NOTE - NS_OBGYNHISTORY_OBGYN_ALL_OB_FT
OBHx: Primigravida    GynHx  Denies STI, PID, abnl PAP, fibroids, cysts  D&C in 2021 for endometrial polyp

## 2022-12-03 NOTE — OB PROVIDER TRIAGE NOTE - NSHPPHYSICALEXAM_GEN_ALL_CORE
Vital Signs Last 24 Hrs  T(C): 37.1 (03 Dec 2022 17:49), Max: 37.1 (02 Dec 2022 19:13)  T(F): 98.8 (03 Dec 2022 17:49), Max: 98.8 (03 Dec 2022 17:49)  HR: 85 (03 Dec 2022 18:50) (85 - 120)  BP: 153/83 (03 Dec 2022 18:50) (116/82 - 166/72)  RR: 16 (03 Dec 2022 17:49) (16 - 17)    Gen: AOx3, no acute distress  CVS: RRR  Lungs: CTABL  Abd: soft, gravid, non tender, mildly palpable contractions  Ext: No edema bilaterally    EFM: 135 /mod/+accels; cat 1  [place]: q  SVE: 4/80/-2 vertex intact @1850  BSS: vtx, post placenta, MVP 5.93cm, BPP 8/8  Speculum: normal vagina and cervix, thick mucus discharge, nonodorous, nitrazine pos, ferning negative Vital Signs Last 24 Hrs  T(C): 37.1 (03 Dec 2022 17:49), Max: 37.1 (02 Dec 2022 19:13)  T(F): 98.8 (03 Dec 2022 17:49), Max: 98.8 (03 Dec 2022 17:49)  HR: 85 (03 Dec 2022 18:50) (85 - 120)  BP: 153/83 (03 Dec 2022 18:50) (116/82 - 166/72)  RR: 16 (03 Dec 2022 17:49) (16 - 17)    Gen: AOx3, no acute distress  CVS: RRR  Lungs: CTABL  Abd: soft, gravid, non tender, mildly palpable contractions  Ext: 1+ edema bilaterally, nontender to palpation  Neuro: 2+ brachial reflexes.     EFM: 135 /mod/+accels; cat 1  [place]: q  SVE: 4/80/-2 vertex intact @1850  BSS: vtx, post placenta, MVP 5.93cm, BPP 8/8  Speculum: normal vagina and cervix, thick mucus discharge, nonodorous, nitrazine pos, ferning negative

## 2022-12-04 LAB
CREAT ?TM UR-MCNC: 128 MG/DL — SIGNIFICANT CHANGE UP
PROT ?TM UR-MCNC: 18 MG/DLG/24H — SIGNIFICANT CHANGE UP
PROT/CREAT UR-RTO: 0.1 RATIO — SIGNIFICANT CHANGE UP (ref 0–0.2)

## 2022-12-05 ENCOUNTER — ASOB RESULT (OUTPATIENT)
Age: 27
End: 2022-12-05

## 2022-12-05 ENCOUNTER — OUTPATIENT (OUTPATIENT)
Dept: OUTPATIENT SERVICES | Facility: HOSPITAL | Age: 27
LOS: 1 days | Discharge: HOME | End: 2022-12-05

## 2022-12-05 ENCOUNTER — APPOINTMENT (OUTPATIENT)
Dept: ANTEPARTUM | Facility: CLINIC | Age: 27
End: 2022-12-05

## 2022-12-05 ENCOUNTER — APPOINTMENT (OUTPATIENT)
Dept: MATERNAL FETAL MEDICINE | Facility: CLINIC | Age: 27
End: 2022-12-05

## 2022-12-05 VITALS
BODY MASS INDEX: 41.82 KG/M2 | SYSTOLIC BLOOD PRESSURE: 130 MMHG | DIASTOLIC BLOOD PRESSURE: 80 MMHG | HEART RATE: 109 BPM | HEIGHT: 63 IN | WEIGHT: 236 LBS

## 2022-12-05 VITALS
SYSTOLIC BLOOD PRESSURE: 124 MMHG | TEMPERATURE: 98 F | DIASTOLIC BLOOD PRESSURE: 74 MMHG | RESPIRATION RATE: 20 BRPM | HEART RATE: 88 BPM

## 2022-12-05 VITALS — HEART RATE: 80 BPM | DIASTOLIC BLOOD PRESSURE: 70 MMHG | SYSTOLIC BLOOD PRESSURE: 137 MMHG

## 2022-12-05 DIAGNOSIS — Z02.9 ENCOUNTER FOR ADMINISTRATIVE EXAMINATIONS, UNSPECIFIED: ICD-10-CM

## 2022-12-05 LAB
AMPHET UR-MCNC: NEGATIVE — SIGNIFICANT CHANGE UP
BARBITURATES UR SCN-MCNC: NEGATIVE — SIGNIFICANT CHANGE UP
BENZODIAZ UR-MCNC: NEGATIVE — SIGNIFICANT CHANGE UP
BILIRUB UR QL STRIP: NORMAL
BILIRUB UR QL STRIP: NORMAL
BP DIAS: 55 MM HG
BP DIAS: 79 MM HG
BP SYS: 112 MM HG
BP SYS: 132 MM HG
BUPRENORPHINE SCREEN, URINE RESULT: NEGATIVE — SIGNIFICANT CHANGE UP
CLARITY UR: CLEAR
CLARITY UR: CLEAR
COCAINE METAB.OTHER UR-MCNC: NEGATIVE — SIGNIFICANT CHANGE UP
COLLECTION METHOD: NORMAL
COLLECTION METHOD: NORMAL
FENTANYL UR QL: NEGATIVE — SIGNIFICANT CHANGE UP
FETAL HEART RATE (BPM): 147
FETAL MOVEMENT: PRESENT
FETAL MOVEMENT: PRESENT
GLUCOSE UR-MCNC: NORMAL
GLUCOSE UR-MCNC: NORMAL
GROUP B BETA STREP DNA (PCR): SIGNIFICANT CHANGE UP
HCG UR QL: 0.2 EU/DL
HCG UR QL: 0.2 EU/DL
HGB UR QL STRIP.AUTO: NORMAL
HGB UR QL STRIP.AUTO: NORMAL
KETONES UR-MCNC: NORMAL
KETONES UR-MCNC: NORMAL
L&D DRUG SCREEN, URINE: SIGNIFICANT CHANGE UP
LEUKOCYTE ESTERASE UR QL STRIP: NORMAL
LEUKOCYTE ESTERASE UR QL STRIP: NORMAL
METHADONE UR-MCNC: NEGATIVE — SIGNIFICANT CHANGE UP
NITRITE UR QL STRIP: NORMAL
NITRITE UR QL STRIP: NORMAL
OB COMMENTS: NORMAL
OB COMMENTS: NORMAL
OPIATES UR-MCNC: NEGATIVE — SIGNIFICANT CHANGE UP
OXYCODONE UR-MCNC: NEGATIVE — SIGNIFICANT CHANGE UP
PCP UR-MCNC: NEGATIVE — SIGNIFICANT CHANGE UP
PH UR STRIP: 5
PH UR STRIP: 6
PROPOXYPHENE QUALITATIVE URINE RESULT: NEGATIVE — SIGNIFICANT CHANGE UP
PROT UR STRIP-MCNC: NORMAL
PROT UR STRIP-MCNC: NORMAL
SCHEDULED VISIT: YES
SCHEDULED VISIT: YES
SOURCE GROUP B STREP: SIGNIFICANT CHANGE UP
SP GR UR STRIP: 1
SP GR UR STRIP: 1.02
URINE ALBUMIN/PROTEIN: NORMAL
URINE ALBUMIN/PROTEIN: NORMAL
URINE GLUCOSE: NORMAL
URINE GLUCOSE: NORMAL
URINE KETONES: NORMAL
URINE KETONES: NORMAL
WEEKS GESTATION: 22.2
WEEKS GESTATION: 36.3

## 2022-12-05 PROCEDURE — ZZZZZ: CPT

## 2022-12-05 PROCEDURE — 76818 FETAL BIOPHYS PROFILE W/NST: CPT

## 2022-12-05 PROCEDURE — 99213 OFFICE O/P EST LOW 20 MIN: CPT | Mod: 25

## 2022-12-05 RX ORDER — ACETAMINOPHEN 500 MG
1000 TABLET ORAL ONCE
Refills: 0 | Status: DISCONTINUED | OUTPATIENT
Start: 2022-12-05 | End: 2022-12-05

## 2022-12-05 RX ORDER — SODIUM CHLORIDE 9 MG/ML
1000 INJECTION, SOLUTION INTRAVENOUS ONCE
Refills: 0 | Status: DISCONTINUED | OUTPATIENT
Start: 2022-12-05 | End: 2022-12-05

## 2022-12-05 NOTE — OB PROVIDER TRIAGE NOTE - NSHPPHYSICALEXAM_GEN_ALL_CORE
Vital Signs Last 24 Hrs  HR: 80 (05 Dec 2022 19:25) (71 - 88)  BP: 137/70 (05 Dec 2022 19:25) (124/74 - 141/77) - falsely elevated when sitting    Physical Exam  Gen: AAOx3, NAD  Abd: soft, gravid, nontender, nondistended, no palpable contractions  Ext: no edema or erythema in bilateral upper and lower extremities  SVE: 4/80/-2, vertex, intact    EFM: 135/mod/+accels  [place]: irregular  BSUS: vertex, anterior placenta, BPP 8/8, MVP 4.07cm

## 2022-12-05 NOTE — OB PROVIDER TRIAGE NOTE - NSOBPROVIDERNOTE_OBGYN_ALL_OB_FT
A/P: 28yo  at 36w6d GA, GBS neg, not in labor, with reassuring maternal and fetal status.    - labor precautions given  - PO hydration encouraged,  - ambulation encouraged    Dr. Luke and Dr. Duran aware

## 2022-12-05 NOTE — OB PROVIDER TRIAGE NOTE - NSMATERNALFETALCONCERNS_OBGYN_ALL_OB_FT
Onset Sunday of sinus congestion and sore throat. No fever. Today 99.4. Today nauseated and sore throat worsened.
cerclage removed 12/1 36.4 weeks

## 2022-12-05 NOTE — OB RN TRIAGE NOTE - FALL HARM RISK - UNIVERSAL INTERVENTIONS
Bed in lowest position, wheels locked, appropriate side rails in place/Call bell, personal items and telephone in reach/Instruct patient to call for assistance before getting out of bed or chair/Non-slip footwear when patient is out of bed/[place] to call system/Physically safe environment - no spills, clutter or unnecessary equipment/Purposeful Proactive Rounding/Room/bathroom lighting operational, light cord in reach

## 2022-12-05 NOTE — OB PROVIDER TRIAGE NOTE - NSHPLABSRESULTS_GEN_ALL_CORE
Sono @ 35wks S=D,  vtx, post placenta, BPP 8/8  Sono @ 34wks S=D, vtx, post placenta, EFW: 2412g, 5lb5oz 55%, BPP 8/8  Sono @ 30wks vtx, post placenta, CL 1.1cm  Sono @ 28wks S=D, EFW: 1193g, 6ry55kp (46%)  CL 1-1.3cm  Sono @ 25.6wks CL 1-1.2cm  Sono @ 21.5wks S-D, nl anatomy, CL 1.3cm    2nd trimester screen negative  Panorama negative    5/4/2022  O Positive  HBSAg negative  RPR nonreactive  Rubella nonimmune  HIV negative

## 2022-12-05 NOTE — DISCUSSION/SUMMARY
[FreeTextEntry1] : 2022\par MFM Attending f/u Consult Note:  Ms Siu is referred by Dr Duran.\par 27yo  at 36w6d, ALEX 2022 by LMP (3/23/2022) c/w early ultrasound, s/p emergency cerclage on  and removal for contractions on .  Cx was 3cm after cervlcage removal.  Pt returned to L&D over week-end for painful contractions, passage of mucous plug and N&V, found to be 4cm/80%, but did not progress after that and was sent home.\par PMHx: \par mild intermittent asthma, denies h/o hospitalizations or intubation \par PSHx: Hysteroscopy for uterine polyp \par            cerclage placement \par Meds: PNVs \par All: NKDA \par OBHx: primigravid \par GynHx: denies h/o ovarian cysts, fibroids, abnormal pap smears, STIs \par SocHx: denies \par  \par Px: Walks with difficulty.  Seen lying uncomfortably on pelvic table. \par VS: 130/80;    236<249#   BMI 42. \par Abd: Fundus nontender.  \par Extr: +3 tight non-pitting edema bilat LE.\par SVE: Not performed.\par \par 8/10: (Prior to cerclage) Speculum: appeared dilated, bulging membrane \par          TVUS: membranes coming through the external os, 3cm dilation \par          BSS: cephalic, posterior placenta, FH 173bpm, gross fetal movement noted \par : Anatomic survey limited by maternal body habitus (BMI 38). \par          SFVtx at 21w5d, plac posterior, not low-lying. AFVol 5.8cm DVP; EFW 476g at 65%ile for GA. \par          No structural anomalies identified. \par 11/15: 34w0d, 2412g (55%), MVP 6.21cm, vertex, post placenta\par : Single fetus, vertex presentation with posterior placenta. DVP 4.6cm.\par          BPP 8/10, -2 for NRNST for over 40 min.  \par \par A/P: 27yo  at 36w6d, ALEX 2022 by LMP (3/23/2022) c/w early ultrasound, s/p cerclage removal on , Co-managed with Dr. Duran. \par \par 1. Cervical Insufficiency: Ms Siu presented with painless dilation, classic for cervical insufficiency. \par - s/p cerclage removal on \par \par 2. Pregnancy \par S/p therapeutic rest, now with continued painful contractions, Cx = 4cm 80% 2 days ago. \par \par 3. Decreased fetal movement with nonreactive NST.  Presentation is concerning given advanced cervical dilation, maternal perception of decreased fetal movement, despite FM observed on BPP and persistent abdominal pain with out cervical change. \par Recommend: \par Return to L&D for observation and monitoring.  Consider augmentation if: \par   - If either non-reactive tracing or decreased fetal movement persists.\par   - If pt has reached 5cm. dilation. \par If none of the above, consider therapeutic rest with Morphine 4mg IVP and 4-8mg IM, or admission to  for monitoring. \par If monitoring is reassuring and no further cervical change, pt may also go home, as per her preference and Dr Duran's preference.\par \par MD Tito, FACOG

## 2022-12-05 NOTE — OB PROVIDER TRIAGE NOTE - HISTORY OF PRESENT ILLNESS
26yo  at 36w6d GA (ALEX: 22, by first trimester sono) presents to labor and delivery from high risk clinic due to abnormal tracing. Per Dr. Soliz, the patients tracing was flat and her BPP was 6/8 for no movement. Patient reports feeling movement. Endorses constant back pain, worse when lying down. Denies contractions, vaginal bleeding, and leakage of fluids. GBS neg.

## 2022-12-06 DIAGNOSIS — O26.893 OTHER SPECIFIED PREGNANCY RELATED CONDITIONS, THIRD TRIMESTER: ICD-10-CM

## 2022-12-06 DIAGNOSIS — R10.9 UNSPECIFIED ABDOMINAL PAIN: ICD-10-CM

## 2022-12-07 ENCOUNTER — INPATIENT (INPATIENT)
Facility: HOSPITAL | Age: 27
LOS: 2 days | Discharge: HOME | End: 2022-12-10
Attending: OBSTETRICS & GYNECOLOGY | Admitting: OBSTETRICS & GYNECOLOGY

## 2022-12-07 VITALS
DIASTOLIC BLOOD PRESSURE: 93 MMHG | RESPIRATION RATE: 20 BRPM | TEMPERATURE: 99 F | SYSTOLIC BLOOD PRESSURE: 134 MMHG | HEART RATE: 109 BPM

## 2022-12-07 DIAGNOSIS — Z98.890 OTHER SPECIFIED POSTPROCEDURAL STATES: Chronic | ICD-10-CM

## 2022-12-07 DIAGNOSIS — O26.872 CERVICAL SHORTENING, SECOND TRIMESTER: ICD-10-CM

## 2022-12-07 DIAGNOSIS — O99.213 OBESITY COMPLICATING PREGNANCY, THIRD TRIMESTER: ICD-10-CM

## 2022-12-07 DIAGNOSIS — Z3A.36 36 WEEKS GESTATION OF PREGNANCY: ICD-10-CM

## 2022-12-07 DIAGNOSIS — O47.03 FALSE LABOR BEFORE 37 COMPLETED WEEKS OF GESTATION, THIRD TRIMESTER: ICD-10-CM

## 2022-12-07 PROBLEM — J45.909 UNSPECIFIED ASTHMA, UNCOMPLICATED: Chronic | Status: ACTIVE | Noted: 2022-08-10

## 2022-12-07 LAB
A VAGINAE DNA VAG QL NAA+PROBE: SIGNIFICANT CHANGE UP
ALBUMIN SERPL ELPH-MCNC: 3.7 G/DL — SIGNIFICANT CHANGE UP (ref 3.5–5.2)
ALP SERPL-CCNC: 153 U/L — HIGH (ref 30–115)
ALT FLD-CCNC: 19 U/L — SIGNIFICANT CHANGE UP (ref 0–41)
AMPHET UR-MCNC: NEGATIVE — SIGNIFICANT CHANGE UP
ANION GAP SERPL CALC-SCNC: 13 MMOL/L — SIGNIFICANT CHANGE UP (ref 7–14)
APPEARANCE UR: ABNORMAL
APTT BLD: 28.3 SEC — SIGNIFICANT CHANGE UP (ref 27–39.2)
AST SERPL-CCNC: 21 U/L — SIGNIFICANT CHANGE UP (ref 0–41)
BACTERIA # UR AUTO: ABNORMAL
BARBITURATES UR SCN-MCNC: NEGATIVE — SIGNIFICANT CHANGE UP
BASOPHILS # BLD AUTO: 0.01 K/UL — SIGNIFICANT CHANGE UP (ref 0–0.2)
BASOPHILS NFR BLD AUTO: 0.1 % — SIGNIFICANT CHANGE UP (ref 0–1)
BENZODIAZ UR-MCNC: NEGATIVE — SIGNIFICANT CHANGE UP
BILIRUB SERPL-MCNC: 0.3 MG/DL — SIGNIFICANT CHANGE UP (ref 0.2–1.2)
BILIRUB UR-MCNC: NEGATIVE — SIGNIFICANT CHANGE UP
BUN SERPL-MCNC: 5 MG/DL — LOW (ref 10–20)
BUPRENORPHINE SCREEN, URINE RESULT: NEGATIVE — SIGNIFICANT CHANGE UP
BVAB2 DNA VAG QL NAA+PROBE: SIGNIFICANT CHANGE UP
C ALBICANS DNA VAG QL NAA+PROBE: NEGATIVE — SIGNIFICANT CHANGE UP
C GLABRATA DNA VAG QL NAA+PROBE: NEGATIVE — SIGNIFICANT CHANGE UP
C KRUSEI DNA VAG QL NAA+PROBE: NEGATIVE — SIGNIFICANT CHANGE UP
C LUSITANIAE DNA VAG QL NAA+PROBE: NEGATIVE — SIGNIFICANT CHANGE UP
C TRACH RRNA SPEC QL NAA+PROBE: NEGATIVE — SIGNIFICANT CHANGE UP
CALCIUM SERPL-MCNC: 9.3 MG/DL — SIGNIFICANT CHANGE UP (ref 8.4–10.4)
CHLORIDE SERPL-SCNC: 103 MMOL/L — SIGNIFICANT CHANGE UP (ref 98–110)
CO2 SERPL-SCNC: 22 MMOL/L — SIGNIFICANT CHANGE UP (ref 17–32)
COCAINE METAB.OTHER UR-MCNC: NEGATIVE — SIGNIFICANT CHANGE UP
COLOR SPEC: SIGNIFICANT CHANGE UP
CREAT ?TM UR-MCNC: 122 MG/DL — SIGNIFICANT CHANGE UP
CREAT SERPL-MCNC: 0.6 MG/DL — LOW (ref 0.7–1.5)
DIFF PNL FLD: ABNORMAL
EGFR: 125 ML/MIN/1.73M2 — SIGNIFICANT CHANGE UP
EOSINOPHIL # BLD AUTO: 0.05 K/UL — SIGNIFICANT CHANGE UP (ref 0–0.7)
EOSINOPHIL NFR BLD AUTO: 0.4 % — SIGNIFICANT CHANGE UP (ref 0–8)
EPI CELLS # UR: 18 /HPF — HIGH (ref 0–5)
FENTANYL UR QL: NEGATIVE — SIGNIFICANT CHANGE UP
FIBRINOGEN PPP-MCNC: >700 MG/DL — HIGH (ref 204.4–570.6)
GLUCOSE SERPL-MCNC: 74 MG/DL — SIGNIFICANT CHANGE UP (ref 70–99)
GLUCOSE UR QL: NEGATIVE — SIGNIFICANT CHANGE UP
HCT VFR BLD CALC: 35.4 % — LOW (ref 37–47)
HGB BLD-MCNC: 11.3 G/DL — LOW (ref 12–16)
HYALINE CASTS # UR AUTO: 1 /LPF — SIGNIFICANT CHANGE UP (ref 0–7)
IMM GRANULOCYTES NFR BLD AUTO: 0.3 % — SIGNIFICANT CHANGE UP (ref 0.1–0.3)
INR BLD: 1.25 RATIO — SIGNIFICANT CHANGE UP (ref 0.65–1.3)
KETONES UR-MCNC: NEGATIVE — SIGNIFICANT CHANGE UP
L&D DRUG SCREEN, URINE: SIGNIFICANT CHANGE UP
LDH SERPL L TO P-CCNC: 236 — SIGNIFICANT CHANGE UP (ref 50–242)
LEUKOCYTE ESTERASE UR-ACNC: ABNORMAL
LYMPHOCYTES # BLD AUTO: 2.53 K/UL — SIGNIFICANT CHANGE UP (ref 1.2–3.4)
LYMPHOCYTES # BLD AUTO: 22.1 % — SIGNIFICANT CHANGE UP (ref 20.5–51.1)
MCHC RBC-ENTMCNC: 26.5 PG — LOW (ref 27–31)
MCHC RBC-ENTMCNC: 31.9 G/DL — LOW (ref 32–37)
MCV RBC AUTO: 83.1 FL — SIGNIFICANT CHANGE UP (ref 81–99)
MEGA1 DNA VAG QL NAA+PROBE: SIGNIFICANT CHANGE UP
METHADONE UR-MCNC: NEGATIVE — SIGNIFICANT CHANGE UP
MONOCYTES # BLD AUTO: 0.66 K/UL — HIGH (ref 0.1–0.6)
MONOCYTES NFR BLD AUTO: 5.8 % — SIGNIFICANT CHANGE UP (ref 1.7–9.3)
N GONORRHOEA RRNA SPEC QL NAA+PROBE: NEGATIVE — SIGNIFICANT CHANGE UP
NEUTROPHILS # BLD AUTO: 8.15 K/UL — HIGH (ref 1.4–6.5)
NEUTROPHILS NFR BLD AUTO: 71.3 % — SIGNIFICANT CHANGE UP (ref 42.2–75.2)
NITRITE UR-MCNC: NEGATIVE — SIGNIFICANT CHANGE UP
NRBC # BLD: 0 /100 WBCS — SIGNIFICANT CHANGE UP (ref 0–0)
OPIATES UR-MCNC: NEGATIVE — SIGNIFICANT CHANGE UP
OXYCODONE UR-MCNC: NEGATIVE — SIGNIFICANT CHANGE UP
PCP UR-MCNC: NEGATIVE — SIGNIFICANT CHANGE UP
PH UR: 6.5 — SIGNIFICANT CHANGE UP (ref 5–8)
PLATELET # BLD AUTO: 218 K/UL — SIGNIFICANT CHANGE UP (ref 130–400)
POTASSIUM SERPL-MCNC: 3.6 MMOL/L — SIGNIFICANT CHANGE UP (ref 3.5–5)
POTASSIUM SERPL-SCNC: 3.6 MMOL/L — SIGNIFICANT CHANGE UP (ref 3.5–5)
PRENATAL SYPHILIS TEST: SIGNIFICANT CHANGE UP
PROPOXYPHENE QUALITATIVE URINE RESULT: NEGATIVE — SIGNIFICANT CHANGE UP
PROT ?TM UR-MCNC: 17 MG/DLG/24H — SIGNIFICANT CHANGE UP
PROT SERPL-MCNC: 6.9 G/DL — SIGNIFICANT CHANGE UP (ref 6–8)
PROT UR-MCNC: NEGATIVE — SIGNIFICANT CHANGE UP
PROT/CREAT UR-RTO: 0.1 RATIO — SIGNIFICANT CHANGE UP (ref 0–0.2)
PROTHROM AB SERPL-ACNC: 14.3 SEC — HIGH (ref 9.95–12.87)
RBC # BLD: 4.26 M/UL — SIGNIFICANT CHANGE UP (ref 4.2–5.4)
RBC # FLD: 17.1 % — HIGH (ref 11.5–14.5)
RBC CASTS # UR COMP ASSIST: 5 /HPF — HIGH (ref 0–4)
SARS-COV-2 RNA SPEC QL NAA+PROBE: SIGNIFICANT CHANGE UP
SODIUM SERPL-SCNC: 138 MMOL/L — SIGNIFICANT CHANGE UP (ref 135–146)
SP GR SPEC: 1.01 — SIGNIFICANT CHANGE UP (ref 1.01–1.03)
T VAGINALIS RRNA SPEC QL NAA+PROBE: NEGATIVE — SIGNIFICANT CHANGE UP
URATE SERPL-MCNC: 5.6 MG/DL — SIGNIFICANT CHANGE UP (ref 2.5–7)
UROBILINOGEN FLD QL: SIGNIFICANT CHANGE UP
WBC # BLD: 11.44 K/UL — HIGH (ref 4.8–10.8)
WBC # FLD AUTO: 11.44 K/UL — HIGH (ref 4.8–10.8)
WBC UR QL: 8 /HPF — HIGH (ref 0–5)

## 2022-12-07 RX ORDER — SODIUM CHLORIDE 9 MG/ML
1000 INJECTION, SOLUTION INTRAVENOUS
Refills: 0 | Status: DISCONTINUED | OUTPATIENT
Start: 2022-12-07 | End: 2022-12-08

## 2022-12-07 RX ORDER — OXYTOCIN 10 UNIT/ML
333.33 VIAL (ML) INJECTION
Qty: 20 | Refills: 0 | Status: DISCONTINUED | OUTPATIENT
Start: 2022-12-07 | End: 2022-12-08

## 2022-12-07 RX ORDER — INFLUENZA VIRUS VACCINE 15; 15; 15; 15 UG/.5ML; UG/.5ML; UG/.5ML; UG/.5ML
0.5 SUSPENSION INTRAMUSCULAR ONCE
Refills: 0 | Status: DISCONTINUED | OUTPATIENT
Start: 2022-12-07 | End: 2022-12-08

## 2022-12-07 RX ORDER — OXYTOCIN 10 UNIT/ML
2 VIAL (ML) INJECTION
Qty: 30 | Refills: 0 | Status: DISCONTINUED | OUTPATIENT
Start: 2022-12-07 | End: 2022-12-08

## 2022-12-07 RX ORDER — CITRIC ACID/SODIUM CITRATE 300-500 MG
15 SOLUTION, ORAL ORAL EVERY 6 HOURS
Refills: 0 | Status: DISCONTINUED | OUTPATIENT
Start: 2022-12-07 | End: 2022-12-08

## 2022-12-07 RX ADMIN — SODIUM CHLORIDE 125 MILLILITER(S): 9 INJECTION, SOLUTION INTRAVENOUS at 21:16

## 2022-12-07 RX ADMIN — Medication 2 MILLIUNIT(S)/MIN: at 21:16

## 2022-12-07 NOTE — OB PROVIDER H&P - ASSESSMENT
28yo  @ 37w1d, GBS neg, s/p cerclage, IOL for gHTN per MFM    -admit to labor and delivery  -pain management prn   -continous efm & toco  -admission labs + PELs  -IV access   -IV hydration   -diet: clear liquid diet     Dr. Duran and Dr. Luke aware

## 2022-12-07 NOTE — OB PROVIDER H&P - NSHPPHYSICALEXAM_GEN_ALL_CORE
Vital Signs Last 24 Hrs  T(C): 37.1 (07 Dec 2022 14:56), Max: 37.1 (07 Dec 2022 14:56)  T(F): 98.7 (07 Dec 2022 14:56), Max: 98.7 (07 Dec 2022 14:56)  HR: 100 (07 Dec 2022 15:42) (100 - 109)  BP: 127/90 (07 Dec 2022 15:42) (123/87 - 134/93)  RR: 20 (07 Dec 2022 14:56) (20 - 20)    Gen: AOx3, NAD  Abdomen: soft, gravid, nontender, no palpable contractions  Cardio: RRR  Pulm: CTABL  Ext: mild swelling and +1 edema bilaterally, +2 UE DTR

## 2022-12-07 NOTE — OB PROVIDER H&P - NSLOWPPHRISK_OBGYN_A_OB
No previous uterine incision/Manzo Pregnancy/Less than or equal to 4 previous vaginal births/No known bleeding disorder/No history of postpartum hemorrhage/No other PPH risks indicated

## 2022-12-07 NOTE — OB RN PATIENT PROFILE - AS SC BRADEN NUTRITION
Worse currently with increased stress. Has been \"stress eating\" as a way to deal with her depression. Increase Effexor XR to 150 mg po qd. (4) excellent

## 2022-12-07 NOTE — OB PROVIDER H&P - HISTORY OF PRESENT ILLNESS
26yo  @ 37w1d by LMP, ALEX 22, presents to triage for induction for gHTN per M. She states that shes had contractions for the past week, increased in frequency and strength at 1000 today, 10/10, irregular contractions. She denies any vb or lof. She endorses good fetal movement. S/p cerclage, removed on 22. She is a gestational hypertensive per [facility]. GBS neg

## 2022-12-07 NOTE — OB PROVIDER H&P - NSICDXPASTMEDICALHX_GEN_ALL_CORE_FT
4 PAST MEDICAL HISTORY:  Mild asthma No h/o hospitalizations or intubations, exascerbated by seasonal allergies

## 2022-12-07 NOTE — PROGRESS NOTE ADULT - SUBJECTIVE AND OBJECTIVE BOX
PGY2 Note    Patient seen at bedside for evaluation of labor progression, doing well, no complaints.     T(F): 98.6 (22 @ 21:18), Max: 98.7 (22 @ 14:56)  HR: 119 (22 @ 23:11) (88 - 119)  BP: 132/69 (22 @ 23:07) (121/66 - 167/94)  RR: 18 (22 @ 21:18) (18 - 20)  SpO2: 98% (22 @ 23:11) (97% - 100%)    EFM: 145/mod/pos acc  TOCO: irregular  SVE: /-1, s/p SROM @2315     Medications:  (ADM OVERRIDE): 1 Each (22 @ 17:40)  (ADM OVERRIDE): 1 Each (22 @ 18:06)  (ADM OVERRIDE): 1 Each (22 @ 19:08)  lactated ringers.: 125 mL/Hr (22 @ 15:53)  oxytocin Infusion.: 2 mL/Hr (22 @ 18:54)  epidural @1815      Labs:                        11.3   11.44 )-----------( 218      ( 07 Dec 2022 16:30 )             35.4         138  |  103  |  5<L>  ----------------------------<  74  3.6   |  22  |  0.6<L>    Ca    9.3      07 Dec 2022 16:30    TPro  6.9  /  Alb  3.7  /  TBili  0.3  /  DBili  x   /  AST  21  /  ALT  19  /  AlkPhos  153<H>          Urinalysis Basic - ( 07 Dec 2022 16:31 )    Color: Light Yellow / Appearance: Slightly Turbid / S.008 / pH: x  Gluc: x / Ketone: Negative  / Bili: Negative / Urobili: <2 mg/dL   Blood: x / Protein: Negative / Nitrite: Negative   Leuk Esterase: Large / RBC: 5 /HPF / WBC 8 /HPF   Sq Epi: x / Non Sq Epi: 18 /HPF / Bacteria: Few      L&amp;D Drug Screen, Urine: Done (22 @ 16:30)    Prenatal Syphilis Test: Nonreact (22 @ 16:30)    Uric Acid, Serum: 5.6 mg/dL (22 @ 16:30)    Lactate Dehydrogenase, Serum: 236 (22 @ 16:30)

## 2022-12-07 NOTE — OB PROVIDER H&P - NSHPLABSRESULTS_GEN_ALL_CORE
Sono @ 35wks S=D,  vtx, post placenta, BPP 8/8  Sono @ 34wks S=D, vtx, post placenta, EFW: 2412g, 5lb5oz 55%, BPP 8/8  Sono @ 30wks vtx, post placenta, CL 1.1cm  Sono @ 28wks S=D, EFW: 1193g, 1ab38pm (46%)  CL 1-1.3cm  Sono @ 25.6wks CL 1-1.2cm  Sono @ 21.5wks S-D, nl anatomy, CL 1.3cm    2nd trimester screen negative  Panorama negative    12/03/22   GBS neg  O pos anti neg    5/4/2022  O Positive  HBSAg negative  RPR nonreactive  Rubella nonimmune  HIV negative

## 2022-12-08 ENCOUNTER — TRANSCRIPTION ENCOUNTER (OUTPATIENT)
Age: 27
End: 2022-12-08

## 2022-12-08 LAB
COVID-19 SPIKE DOMAIN AB INTERP: POSITIVE
COVID-19 SPIKE DOMAIN ANTIBODY RESULT: 51.7 U/ML — HIGH
SARS-COV-2 IGG+IGM SERPL QL IA: 51.7 U/ML — HIGH
SARS-COV-2 IGG+IGM SERPL QL IA: POSITIVE

## 2022-12-08 RX ORDER — KETOROLAC TROMETHAMINE 30 MG/ML
30 SYRINGE (ML) INJECTION EVERY 6 HOURS
Refills: 0 | Status: DISCONTINUED | OUTPATIENT
Start: 2022-12-08 | End: 2022-12-09

## 2022-12-08 RX ORDER — CEFAZOLIN SODIUM 1 G
3000 VIAL (EA) INJECTION ONCE
Refills: 0 | Status: DISCONTINUED | OUTPATIENT
Start: 2022-12-08 | End: 2022-12-08

## 2022-12-08 RX ORDER — IBUPROFEN 200 MG
600 TABLET ORAL EVERY 6 HOURS
Refills: 0 | Status: COMPLETED | OUTPATIENT
Start: 2022-12-08 | End: 2023-11-06

## 2022-12-08 RX ORDER — OXYCODONE HYDROCHLORIDE 5 MG/1
5 TABLET ORAL ONCE
Refills: 0 | Status: DISCONTINUED | OUTPATIENT
Start: 2022-12-08 | End: 2022-12-10

## 2022-12-08 RX ORDER — SODIUM CHLORIDE 9 MG/ML
1000 INJECTION, SOLUTION INTRAVENOUS
Refills: 0 | Status: DISCONTINUED | OUTPATIENT
Start: 2022-12-08 | End: 2022-12-10

## 2022-12-08 RX ORDER — CITRIC ACID/SODIUM CITRATE 300-500 MG
30 SOLUTION, ORAL ORAL ONCE
Refills: 0 | Status: COMPLETED | OUTPATIENT
Start: 2022-12-08 | End: 2022-12-08

## 2022-12-08 RX ORDER — FAMOTIDINE 10 MG/ML
20 INJECTION INTRAVENOUS ONCE
Refills: 0 | Status: COMPLETED | OUTPATIENT
Start: 2022-12-08 | End: 2022-12-08

## 2022-12-08 RX ORDER — AZITHROMYCIN 500 MG/1
500 TABLET, FILM COATED ORAL ONCE
Refills: 0 | Status: COMPLETED | OUTPATIENT
Start: 2022-12-08 | End: 2022-12-08

## 2022-12-08 RX ORDER — OXYTOCIN 10 UNIT/ML
333.33 VIAL (ML) INJECTION
Qty: 20 | Refills: 0 | Status: DISCONTINUED | OUTPATIENT
Start: 2022-12-08 | End: 2022-12-08

## 2022-12-08 RX ORDER — OXYTOCIN 10 UNIT/ML
333.33 VIAL (ML) INJECTION
Qty: 20 | Refills: 0 | Status: DISCONTINUED | OUTPATIENT
Start: 2022-12-08 | End: 2022-12-10

## 2022-12-08 RX ORDER — SODIUM CHLORIDE 9 MG/ML
1000 INJECTION, SOLUTION INTRAVENOUS ONCE
Refills: 0 | Status: DISCONTINUED | OUTPATIENT
Start: 2022-12-08 | End: 2022-12-08

## 2022-12-08 RX ORDER — OXYCODONE HYDROCHLORIDE 5 MG/1
5 TABLET ORAL
Refills: 0 | Status: DISCONTINUED | OUTPATIENT
Start: 2022-12-08 | End: 2022-12-10

## 2022-12-08 RX ORDER — MAGNESIUM HYDROXIDE 400 MG/1
30 TABLET, CHEWABLE ORAL
Refills: 0 | Status: DISCONTINUED | OUTPATIENT
Start: 2022-12-08 | End: 2022-12-10

## 2022-12-08 RX ORDER — SIMETHICONE 80 MG/1
80 TABLET, CHEWABLE ORAL EVERY 4 HOURS
Refills: 0 | Status: DISCONTINUED | OUTPATIENT
Start: 2022-12-08 | End: 2022-12-10

## 2022-12-08 RX ORDER — CEFAZOLIN SODIUM 1 G
2000 VIAL (EA) INJECTION ONCE
Refills: 0 | Status: COMPLETED | OUTPATIENT
Start: 2022-12-08 | End: 2022-12-08

## 2022-12-08 RX ORDER — DIPHENHYDRAMINE HCL 50 MG
25 CAPSULE ORAL EVERY 6 HOURS
Refills: 0 | Status: COMPLETED | OUTPATIENT
Start: 2022-12-08 | End: 2023-11-06

## 2022-12-08 RX ORDER — ACETAMINOPHEN 500 MG
975 TABLET ORAL
Refills: 0 | Status: DISCONTINUED | OUTPATIENT
Start: 2022-12-08 | End: 2022-12-10

## 2022-12-08 RX ORDER — LANOLIN
1 OINTMENT (GRAM) TOPICAL EVERY 6 HOURS
Refills: 0 | Status: DISCONTINUED | OUTPATIENT
Start: 2022-12-08 | End: 2022-12-10

## 2022-12-08 RX ADMIN — AZITHROMYCIN 255 MILLIGRAM(S): 500 TABLET, FILM COATED ORAL at 13:38

## 2022-12-08 RX ADMIN — Medication 975 MILLIGRAM(S): at 21:15

## 2022-12-08 RX ADMIN — Medication 30 MILLIGRAM(S): at 18:09

## 2022-12-08 RX ADMIN — FAMOTIDINE 20 MILLIGRAM(S): 10 INJECTION INTRAVENOUS at 11:05

## 2022-12-08 RX ADMIN — Medication 100 MILLIGRAM(S): at 13:17

## 2022-12-08 RX ADMIN — Medication 975 MILLIGRAM(S): at 20:20

## 2022-12-08 RX ADMIN — Medication 30 MILLILITER(S): at 11:06

## 2022-12-08 NOTE — BRIEF OPERATIVE NOTE - NSICDXBRIEFPREOP_GEN_ALL_CORE_FT
PRE-OP DIAGNOSIS:  Arrest of dilation, delivered, current hospitalization 08-Dec-2022 14:48:18  Jessie Juarez

## 2022-12-08 NOTE — OB RN INTRAOPERATIVE NOTE - NS_DISCHARGEDTO_OBGYN_ALL_OB
From: Elvis Bravo  To: Leonard Malik MD  Sent: 7/5/2018 9:56 AM CDT  Subject: Refill    Dr. Malik,  Requesting refill for Tramadol. Should not be a problem as my Insurance has approved refill through 4/19.  Was going to send this out yesterday was not sure if I did. Sorry if this is a repeat.    Thank you.   L&D Pacu

## 2022-12-08 NOTE — BRIEF OPERATIVE NOTE - OPERATION/FINDINGS
Pfannenstiel incision, gravid uterus   LTCS, viable female infant, APGARS 9/9 2880g  normal fallopian tubes, and ovaries bilaterally

## 2022-12-08 NOTE — PROGRESS NOTE ADULT - SUBJECTIVE AND OBJECTIVE BOX
PGY2 NOTE     Patient seen and examined at bedside.  Cervical exam remains unchanged at 6/100/0. At this time; risk, benefits, and alternatives were discussed with the patient regarding proceeding with a  versus continuing with augmentation with pitocin. She understood the risks, alternatives, and benefits of both options including bleeding, injury to surrounding organs (including but not limited to bowel and bladder), and infection. She expressed understanding and has elected to proceed with a  for arrest of dilation.     Hgb/Hct: 11.3/35.4  Blood type: O pos, ab screen negative  Placenta location: posterior placenta  Presentation: cephalic    Plan:  -On call to OR  -NPO  -Antibiotics: ancef, azithro  -Peds and anesthesia to be made aware  -Continuous EFM/toco  -Monitor vitals  -Needs MMR postpartum (rubella nonimmune)    Dr Duran and Dr Richard aware

## 2022-12-08 NOTE — PROGRESS NOTE ADULT - SUBJECTIVE AND OBJECTIVE BOX
Pt evaluated at bedside, comfortable at this time.    T(C): 36.8 (12-08-22 @ 07:59), Max: 37.1 (12-07-22 @ 14:56)  HR: 92 (12-08-22 @ 09:31) (85 - 135)  BP: 137/76 (12-08-22 @ 09:22) (93/51 - 167/94)  RR: 18 (12-08-22 @ 05:54) (16 - 20)  SpO2: 100% (12-08-22 @ 09:36) (95% - 100%)    VE: 6/90/-1  Ctx: q 2 min, [place] units 170  FHR: 130 moderate variability, Cat I    Pitocin @ 24 milliunits

## 2022-12-08 NOTE — PROGRESS NOTE ADULT - SUBJECTIVE AND OBJECTIVE BOX
PGY2 Note    Patient seen at bedside for evaluation of labor progression, doing well, no complaints.     T(F): 98.42 (22 @ 03:22), Max: 98.7 (22 @ 14:56)  HR: 115 (22 @ 05:46) (88 - 135)  BP: 131/78 (22 @ 05:38) (93/51 - 167/94)  RR: 16 (22 @ 03:22) (16 - 20)  SpO2: 100% (22 @ 05:46) (95% - 100%)    EFM: 135/mod/pos acc  TOCO: 2 mins, 210MVU  SVE: 6.5/100/0, vtx, s/p SROM     Medications:  (ADM OVERRIDE): 1 Each (22 @ 17:40)  (ADM OVERRIDE): 1 Each (22 @ 18:06)  (ADM OVERRIDE): 1 Each (22 @ 19:08)  (ADM OVERRIDE): 1 Each (22 @ 05:06)  lactated ringers.: 125 mL/Hr (22 @ 15:53)  oxytocin Infusion.: 2 mL/Hr (22 @ 18:54), currently on 24U  epidural @1815      Labs:                        11.3   11.44 )-----------( 218      ( 07 Dec 2022 16:30 )             35.4         138  |  103  |  5<L>  ----------------------------<  74  3.6   |  22  |  0.6<L>    Ca    9.3      07 Dec 2022 16:30    TPro  6.9  /  Alb  3.7  /  TBili  0.3  /  DBili  x   /  AST  21  /  ALT  19  /  AlkPhos  153<H>          Urinalysis Basic - ( 07 Dec 2022 16:31 )    Color: Light Yellow / Appearance: Slightly Turbid / S.008 / pH: x  Gluc: x / Ketone: Negative  / Bili: Negative / Urobili: <2 mg/dL   Blood: x / Protein: Negative / Nitrite: Negative   Leuk Esterase: Large / RBC: 5 /HPF / WBC 8 /HPF   Sq Epi: x / Non Sq Epi: 18 /HPF / Bacteria: Few      L&amp;D Drug Screen, Urine: Done (22 @ 16:30)    Prenatal Syphilis Test: Nonreact (22 @ 16:30)    Uric Acid, Serum: 5.6 mg/dL (22 @ 16:30)    Lactate Dehydrogenase, Serum: 236 (22 @ 16:30)    Protein/Creatinine Ratio Calculation: 0.1 Ratio (22 @ 19:10)

## 2022-12-08 NOTE — PROGRESS NOTE ADULT - SUBJECTIVE AND OBJECTIVE BOX
PGY2 Note    Patient seen at bedside for evaluation of labor progression, doing well, no complaints.     T(F): 98.42 (22 @ 02:17), Max: 98.7 (22 @ 14:56)  HR: 109 (22 @ 02:22) (88 - 135)  BP: 105/59 (22 @ 02:22) (93/51 - 167/94)  RR: 18 (22 @ 02:17) (18 - 20)  SpO2: 97% (22 @ 02:26) (95% - 100%)    EFM: 140/mod/pos acc  TOCO: 2 mins  SVE: 6/90/-1, vtx, s/p SROM     Medications:  (ADM OVERRIDE): 1 Each (22 @ 17:40)  (ADM OVERRIDE): 1 Each (22 @ 18:06)  (ADM OVERRIDE): 1 Each (22 @ 19:08)  lactated ringers.: 125 mL/Hr (22 @ 15:53)  oxytocin Infusion.: 2 mL/Hr (22 @ 18:54)      Labs:                        11.3   11.44 )-----------( 218      ( 07 Dec 2022 16:30 )             35.4         138  |  103  |  5<L>  ----------------------------<  74  3.6   |  22  |  0.6<L>    Ca    9.3      07 Dec 2022 16:30    TPro  6.9  /  Alb  3.7  /  TBili  0.3  /  DBili  x   /  AST  21  /  ALT  19  /  AlkPhos  153<H>          Urinalysis Basic - ( 07 Dec 2022 16:31 )    Color: Light Yellow / Appearance: Slightly Turbid / S.008 / pH: x  Gluc: x / Ketone: Negative  / Bili: Negative / Urobili: <2 mg/dL   Blood: x / Protein: Negative / Nitrite: Negative   Leuk Esterase: Large / RBC: 5 /HPF / WBC 8 /HPF   Sq Epi: x / Non Sq Epi: 18 /HPF / Bacteria: Few      L&amp;D Drug Screen, Urine: Done (22 @ 16:30)    Prenatal Syphilis Test: Nonreact (22 @ 16:30)    Uric Acid, Serum: 5.6 mg/dL (22 @ 16:30)    Lactate Dehydrogenase, Serum: 236 (22 @ 16:30)    Protein/Creatinine Ratio Calculation: 0.1 Ratio (22 @ 19:10)

## 2022-12-08 NOTE — BRIEF OPERATIVE NOTE - NSICDXBRIEFPOSTOP_GEN_ALL_CORE_FT
POST-OP DIAGNOSIS:  Arrest of dilation, delivered, current hospitalization 08-Dec-2022 14:48:21  Jessie Juarez

## 2022-12-08 NOTE — PROGRESS NOTE ADULT - SUBJECTIVE AND OBJECTIVE BOX
PGY 1 Note    Patient seen at bedside for evaluation of labor progression. Epidural in place. Pt says she feels tired. Pitocin @24u    T(F): 98.42 (03:22)  HR: 116 (04:46)  BP: 130/75 (04:37)  RR: 16 (03:22)    EFM: 130/mod/+accels  TOCO: q1-3m  SVE: /-1 @0217, IUPC in place  IUPC: 120 MV units    Labs:                        11.3   11.44 )-----------( 218      ( 07 Dec 2022 16:30 )             35.4     12-    138  |  103  |  5<L>  ----------------------------<  74  3.6   |  22  |  0.6<L>    Ca    9.3      07 Dec 2022 16:30    TPro  6.9  /  Alb  3.7  /  TBili  0.3  /  DBili  x   /  AST  21  /  ALT  19  /  AlkPhos  153<H>  12-07      Urinalysis Basic - ( 07 Dec 2022 16:31 )    Color: Light Yellow / Appearance: Slightly Turbid / S.008 / pH: x  Gluc: x / Ketone: Negative  / Bili: Negative / Urobili: <2 mg/dL   Blood: x / Protein: Negative / Nitrite: Negative   Leuk Esterase: Large / RBC: 5 /HPF / WBC 8 /HPF   Sq Epi: x / Non Sq Epi: 18 /HPF / Bacteria: Few          Meds: influenza   Vaccine 0.5 milliLiter(s) IntraMuscular once  lactated ringers. 1000 milliLiter(s) IV Continuous <Continuous>  oxytocin Infusion 333.333 milliUNIT(s)/Min IV Continuous <Continuous>  oxytocin Infusion. 2 milliUNIT(s)/Min IV Continuous <Continuous>

## 2022-12-08 NOTE — OB PROVIDER DELIVERY SUMMARY - NSPROVIDERDELIVERYNOTE_OBGYN_ALL_OB_FT
Patient arrest of dilation at 6.5 cm, underwent uncomplicated primary LTCS with viable female infant, APGARs 9/9, 2880g.  See operative report for details of delivery.

## 2022-12-08 NOTE — PROGRESS NOTE ADULT - SUBJECTIVE AND OBJECTIVE BOX
PGY1 Note:  Section    POD0. Pt seen and evaluated at bedside. Pain well controlled. Denies dizziness/lightheadedness/CP/SOB/palpitations. Denies fever, chills, nausea/vomiting, diarrhea, dysuria, LE pain.     Ambulating: No  Voiding: rhodes in place adequate output   Flatus: no  Bowel movements: no  Breast or bottle feeding: breast  Diet: tolerating liquids     Physical Exam  Vital Signs Last 24 Hrs  T(C): 36.5 (08 Dec 2022 14:52), Max: 37.1 (07 Dec 2022 18:31)  T(F): 97.7 (08 Dec 2022 14:52), Max: 98.6 (07 Dec 2022 21:18)  HR: 106 (08 Dec 2022 16:57) (84 - 135)  BP: 144/56 (08 Dec 2022 14:47) (93/51 - 157/89)  RR: 18 (08 Dec 2022 09:59) (16 - 20)  SpO2: 98% (08 Dec 2022 16:57) (95% - 100%)      Gen: AAOx3, NAD  Heart: RRR, S1S2+  Lungs: CTA B/L, no r/r/w   Fundus: firm, below umbilicus   Wound: Pfannenstiel incision, Dermabond, c/d/i   Abd: Soft, nontender, nondistended, BS+  Lochia: minimal   Ext: No calf tenderness, 2+ swelling    Labs:                        11.3   11.44 )-----------( 218      ( 07 Dec 2022 16:30 )             35.4                         10.7   11.02 )-----------( 195      ( 03 Dec 2022 18:49 )             33.8        MEDICATIONS  (STANDING):  acetaminophen     Tablet .. 975 milliGRAM(s) Oral <User Schedule>  ibuprofen  Tablet. 600 milliGRAM(s) Oral every 6 hours  ketorolac   Injectable 30 milliGRAM(s) IV Push every 6 hours  lactated ringers. 1000 milliLiter(s) (125 mL/Hr) IV Continuous <Continuous>  measles/mumps/rubella Vaccine 0.5 milliLiter(s) SubCutaneous once  oxytocin Infusion 333.333 milliUNIT(s)/Min (1000 mL/Hr) IV Continuous <Continuous>    MEDICATIONS  (PRN):  diphenhydrAMINE 25 milliGRAM(s) Oral every 6 hours PRN Pruritus  lanolin Ointment 1 Application(s) Topical every 6 hours PRN Sore Nipples  magnesium hydroxide Suspension 30 milliLiter(s) Oral two times a day PRN Constipation  oxyCODONE    IR 5 milliGRAM(s) Oral every 3 hours PRN Moderate to Severe Pain (4-10)  oxyCODONE    IR 5 milliGRAM(s) Oral once PRN Moderate to Severe Pain (4-10)  simethicone 80 milliGRAM(s) Chew every 4 hours PRN Gas       PGY1 Note:  Section    POD0. Pt seen and evaluated at bedside. Pain well controlled. Denies dizziness/lightheadedness/CP/SOB/palpitations. Denies fever, chills, nausea/vomiting, diarrhea, dysuria, LE pain.     Ambulating: No  Voiding: rhodes in place adequate output   Flatus: no  Bowel movements: no  Breast or bottle feeding: breast  Diet: tolerating liquids     Physical Exam  Vital Signs Last 24 Hrs  T(C): 36.5 (08 Dec 2022 14:52), Max: 37.1 (07 Dec 2022 18:31)  T(F): 97.7 (08 Dec 2022 14:52), Max: 98.6 (07 Dec 2022 21:18)  HR: 106 (08 Dec 2022 16:57) (84 - 135)  BP: 144/56 (08 Dec 2022 14:47) (93/51 - 157/89)  RR: 18 (08 Dec 2022 09:59) (16 - 20)  SpO2: 98% (08 Dec 2022 16:57) (95% - 100%)      Gen: AAOx3, NAD  Heart: RRR, S1S2+  Lungs: CTA B/L, no r/r/w   Fundus: firm, below umbilicus   Wound: Pfannenstiel incision, Dermabond, c/d/i   Abd: Soft, nontender, nondistended, BS+  Lochia: minimal   Ext: No calf tenderness, 2+ swelling    UO (50cc/hr): (6319-0049) 400cc    Labs:                        11.3   11.44 )-----------( 218      ( 07 Dec 2022 16:30 )             35.4                         10.7   11.02 )-----------( 195      ( 03 Dec 2022 18:49 )             33.8        MEDICATIONS  (STANDING):  acetaminophen     Tablet .. 975 milliGRAM(s) Oral <User Schedule>  ibuprofen  Tablet. 600 milliGRAM(s) Oral every 6 hours  ketorolac   Injectable 30 milliGRAM(s) IV Push every 6 hours  lactated ringers. 1000 milliLiter(s) (125 mL/Hr) IV Continuous <Continuous>  measles/mumps/rubella Vaccine 0.5 milliLiter(s) SubCutaneous once  oxytocin Infusion 333.333 milliUNIT(s)/Min (1000 mL/Hr) IV Continuous <Continuous>    MEDICATIONS  (PRN):  diphenhydrAMINE 25 milliGRAM(s) Oral every 6 hours PRN Pruritus  lanolin Ointment 1 Application(s) Topical every 6 hours PRN Sore Nipples  magnesium hydroxide Suspension 30 milliLiter(s) Oral two times a day PRN Constipation  oxyCODONE    IR 5 milliGRAM(s) Oral every 3 hours PRN Moderate to Severe Pain (4-10)  oxyCODONE    IR 5 milliGRAM(s) Oral once PRN Moderate to Severe Pain (4-10)  simethicone 80 milliGRAM(s) Chew every 4 hours PRN Gas

## 2022-12-09 ENCOUNTER — TRANSCRIPTION ENCOUNTER (OUTPATIENT)
Age: 27
End: 2022-12-09

## 2022-12-09 LAB
BASOPHILS # BLD AUTO: 0.01 K/UL — SIGNIFICANT CHANGE UP (ref 0–0.2)
BASOPHILS NFR BLD AUTO: 0.1 % — SIGNIFICANT CHANGE UP (ref 0–1)
EOSINOPHIL # BLD AUTO: 0.13 K/UL — SIGNIFICANT CHANGE UP (ref 0–0.7)
EOSINOPHIL NFR BLD AUTO: 1.1 % — SIGNIFICANT CHANGE UP (ref 0–8)
HCT VFR BLD CALC: 26.3 % — LOW (ref 37–47)
HGB BLD-MCNC: 8.7 G/DL — LOW (ref 12–16)
IMM GRANULOCYTES NFR BLD AUTO: 0.3 % — SIGNIFICANT CHANGE UP (ref 0.1–0.3)
LYMPHOCYTES # BLD AUTO: 25.6 % — SIGNIFICANT CHANGE UP (ref 20.5–51.1)
LYMPHOCYTES # BLD AUTO: 3.03 K/UL — SIGNIFICANT CHANGE UP (ref 1.2–3.4)
MCHC RBC-ENTMCNC: 27 PG — SIGNIFICANT CHANGE UP (ref 27–31)
MCHC RBC-ENTMCNC: 33.1 G/DL — SIGNIFICANT CHANGE UP (ref 32–37)
MCV RBC AUTO: 81.7 FL — SIGNIFICANT CHANGE UP (ref 81–99)
MONOCYTES # BLD AUTO: 0.81 K/UL — HIGH (ref 0.1–0.6)
MONOCYTES NFR BLD AUTO: 6.8 % — SIGNIFICANT CHANGE UP (ref 1.7–9.3)
NEUTROPHILS # BLD AUTO: 7.83 K/UL — HIGH (ref 1.4–6.5)
NEUTROPHILS NFR BLD AUTO: 66.1 % — SIGNIFICANT CHANGE UP (ref 42.2–75.2)
NRBC # BLD: 0 /100 WBCS — SIGNIFICANT CHANGE UP (ref 0–0)
PLATELET # BLD AUTO: 211 K/UL — SIGNIFICANT CHANGE UP (ref 130–400)
RBC # BLD: 3.22 M/UL — LOW (ref 4.2–5.4)
RBC # FLD: 16.8 % — HIGH (ref 11.5–14.5)
WBC # BLD: 11.84 K/UL — HIGH (ref 4.8–10.8)
WBC # FLD AUTO: 11.84 K/UL — HIGH (ref 4.8–10.8)

## 2022-12-09 PROCEDURE — 93970 EXTREMITY STUDY: CPT | Mod: 26

## 2022-12-09 RX ORDER — ACETAMINOPHEN 500 MG
3 TABLET ORAL
Qty: 0 | Refills: 0 | DISCHARGE
Start: 2022-12-09

## 2022-12-09 RX ORDER — IBUPROFEN 200 MG
1 TABLET ORAL
Qty: 0 | Refills: 0 | DISCHARGE
Start: 2022-12-09

## 2022-12-09 RX ORDER — DIPHENHYDRAMINE HCL 50 MG
25 CAPSULE ORAL EVERY 6 HOURS
Refills: 0 | Status: DISCONTINUED | OUTPATIENT
Start: 2022-12-09 | End: 2022-12-10

## 2022-12-09 RX ORDER — SIMETHICONE 80 MG/1
1 TABLET, CHEWABLE ORAL
Qty: 0 | Refills: 0 | DISCHARGE
Start: 2022-12-09

## 2022-12-09 RX ORDER — IBUPROFEN 200 MG
600 TABLET ORAL EVERY 6 HOURS
Refills: 0 | Status: DISCONTINUED | OUTPATIENT
Start: 2022-12-09 | End: 2022-12-10

## 2022-12-09 RX ADMIN — Medication 30 MILLIGRAM(S): at 01:45

## 2022-12-09 RX ADMIN — Medication 975 MILLIGRAM(S): at 21:17

## 2022-12-09 RX ADMIN — Medication 30 MILLIGRAM(S): at 07:15

## 2022-12-09 RX ADMIN — Medication 25 MILLIGRAM(S): at 03:31

## 2022-12-09 RX ADMIN — Medication 600 MILLIGRAM(S): at 18:25

## 2022-12-09 RX ADMIN — Medication 975 MILLIGRAM(S): at 03:20

## 2022-12-09 RX ADMIN — Medication 25 MILLIGRAM(S): at 13:27

## 2022-12-09 RX ADMIN — Medication 600 MILLIGRAM(S): at 12:08

## 2022-12-09 RX ADMIN — Medication 975 MILLIGRAM(S): at 22:15

## 2022-12-09 RX ADMIN — Medication 30 MILLIGRAM(S): at 06:14

## 2022-12-09 RX ADMIN — Medication 30 MILLIGRAM(S): at 00:46

## 2022-12-09 RX ADMIN — Medication 975 MILLIGRAM(S): at 02:25

## 2022-12-09 NOTE — DISCHARGE NOTE OB - CARE PLAN
1 Principal Discharge DX:	 delivery delivered  Assessment and plan of treatment:	Keep incisions clean and dry. No heavy lifting x4 weeks. Nothing in the vagina for 6 weeks - no sex, tampons, douching, tub baths or pools. May Shower. If you have a fever over 100.4F, severe pain or severe bleeding, please call your doctor or visit the emergency room. Follow up in 1 week for incision check and 6 weeks for postpartum check with your doctor.  Secondary Diagnosis:	Gestational hypertension  Assessment and plan of treatment:	If you have headache, blurry vision, chest pain, difficulty breathing, or severe abdominal pain, call the doctor or return to the hospital. Follow up with your OBGYN in 1 week for a blood pressure check.

## 2022-12-09 NOTE — PROGRESS NOTE ADULT - SUBJECTIVE AND OBJECTIVE BOX
Pt denies all complaints of HA, CP, SOB, palpitations, visual changes. Tolerating regular diet.    Vital Signs Last 24 Hrs  T(C): 36.4 (09 Dec 2022 03:39), Max: 36.8 (08 Dec 2022 09:59)  T(F): 97.6 (09 Dec 2022 03:39), Max: 98.24 (08 Dec 2022 10:32)  HR: 98 (09 Dec 2022 03:39) (84 - 131)  BP: 110/57 (09 Dec 2022 03:39) (110/57 - 158/92)  BP(mean): --  RR: 18 (09 Dec 2022 03:39) (18 - 18)  SpO2: 98% (08 Dec 2022 16:57) (98% - 100%)    ABD: soft NT  FUNDUS: firm below umbilicus  EXT: asymmetric: R>L.  Right +2 pitting edema bilaterally up to mid-thigh, Homans sign negative  Left nonpitting edema up to knee Homans sign negative      CBC drawn and sent 9:30AM

## 2022-12-09 NOTE — DISCHARGE NOTE OB - MEDICATION SUMMARY - MEDICATIONS TO TAKE
I will START or STAY ON the medications listed below when I get home from the hospital:    acetaminophen 325 mg oral tablet  -- 3 tab(s) by mouth every 6 hours, As Needed  -- Indication: For pain    ibuprofen 600 mg oral tablet  -- 1 tab(s) by mouth every 6 hours  -- Indication: For pain    simethicone 80 mg oral tablet, chewable  -- 1 tab(s) by mouth every 4 hours, As needed, Gas  -- Indication: For gas pain

## 2022-12-09 NOTE — PROGRESS NOTE ADULT - SUBJECTIVE AND OBJECTIVE BOX
PGY1 Note:  Section    POD#1. Pt seen and evaluated at bedside. Pain well controlled. Denies dizziness/lightheadedness/CP/SOB/palpitations. Denies fever, chills, nausea/vomiting, diarrhea, dysuria, LE pain.     Ambulating: No  Voiding: Mendez in place, adequate output, removed today  Flatus: Yes  Bowel movements: No  Breast or bottle feeding: breast  Diet: tolerating regular diet     Physical Exam  Vital Signs Last 24 Hrs  T(C): 36.4 (09 Dec 2022 03:39), Max: 36.8 (08 Dec 2022 07:59)  T(F): 97.6 (09 Dec 2022 03:39), Max: 98.24 (08 Dec 2022 07:59)  HR: 98 (09 Dec 2022 03:39) (84 - 131)  BP: 110/57 (09 Dec 2022 03:39) (110/57 - 158/92)  RR: 18 (09 Dec 2022 03:39) (18 - 18)  SpO2: 98% (08 Dec 2022 16:57) (96% - 100%)      Gen: AAOx3, NAD  Heart: RRR, S1S2+  Lungs: CTA B/L, no r/r/w   Fundus: firm, below umbilicus   Wound: Pfannenstiel incision, dermabond,, c/di  Abd: Soft, nontender, nondistended, BS+  Lochia: minimal   Ext: No calf tenderness, no swelling    Labs:                        11.3   11.44 )-----------( 218      ( 07 Dec 2022 16:30 )             35.4                         10.7   11.02 )-----------( 195      ( 03 Dec 2022 18:49 )             33.8        MEDICATIONS  (STANDING):  acetaminophen     Tablet .. 975 milliGRAM(s) Oral <User Schedule>  ibuprofen  Tablet. 600 milliGRAM(s) Oral every 6 hours  lactated ringers. 1000 milliLiter(s) (125 mL/Hr) IV Continuous <Continuous>  measles/mumps/rubella Vaccine 0.5 milliLiter(s) SubCutaneous once  oxytocin Infusion 333.333 milliUNIT(s)/Min (1000 mL/Hr) IV Continuous <Continuous>    MEDICATIONS  (PRN):  diphenhydrAMINE 25 milliGRAM(s) Oral every 6 hours PRN Pruritus  lanolin Ointment 1 Application(s) Topical every 6 hours PRN Sore Nipples  magnesium hydroxide Suspension 30 milliLiter(s) Oral two times a day PRN Constipation  oxyCODONE    IR 5 milliGRAM(s) Oral every 3 hours PRN Moderate to Severe Pain (4-10)  oxyCODONE    IR 5 milliGRAM(s) Oral once PRN Moderate to Severe Pain (4-10)  simethicone 80 milliGRAM(s) Chew every 4 hours PRN Gas

## 2022-12-09 NOTE — DISCHARGE NOTE OB - HOSPITAL COURSE
Admitted for IOL for gHTN, underwent primary LTCS for arrest dilation, discahrge in stable condition.

## 2022-12-09 NOTE — DISCHARGE NOTE OB - PLAN OF CARE
If you have headache, blurry vision, chest pain, difficulty breathing, or severe abdominal pain, call the doctor or return to the hospital. Follow up with your OBGYN in 1 week for a blood pressure check. Keep incisions clean and dry. No heavy lifting x4 weeks. Nothing in the vagina for 6 weeks - no sex, tampons, douching, tub baths or pools. May Shower. If you have a fever over 100.4F, severe pain or severe bleeding, please call your doctor or visit the emergency room. Follow up in 1 week for incision check and 6 weeks for postpartum check with your doctor.

## 2022-12-09 NOTE — DISCHARGE NOTE OB - NS MD DC FALL RISK RISK
For information on Fall & Injury Prevention, visit: [email or web address] OR  [email or web address] OR  https://www.cdc.gov/steadi/patient.html

## 2022-12-09 NOTE — DISCHARGE NOTE OB - CARE PROVIDER_API CALL
Clair Duran)  Obstetrics and Gynecology  [facility] [place] [address]  Phone: [phone]  Fax: [phone]  Follow Up Time:

## 2022-12-09 NOTE — DISCHARGE NOTE OB - PATIENT PORTAL LINK FT
You can access the FollowMyHealth Patient Portal offered by [facility] by registering at the following website: [email or web address]. By joining [email or web address] [email or web address] portal, you will also be able to view your health information using other applications (apps) compatible with our system.

## 2022-12-10 VITALS
RESPIRATION RATE: 20 BRPM | DIASTOLIC BLOOD PRESSURE: 85 MMHG | SYSTOLIC BLOOD PRESSURE: 141 MMHG | HEART RATE: 101 BPM | TEMPERATURE: 98 F

## 2022-12-10 RX ADMIN — Medication 975 MILLIGRAM(S): at 15:22

## 2022-12-10 RX ADMIN — Medication 600 MILLIGRAM(S): at 18:51

## 2022-12-10 RX ADMIN — Medication 0.5 MILLILITER(S): at 18:23

## 2022-12-10 RX ADMIN — Medication 600 MILLIGRAM(S): at 06:59

## 2022-12-10 RX ADMIN — Medication 975 MILLIGRAM(S): at 02:08

## 2022-12-10 RX ADMIN — Medication 600 MILLIGRAM(S): at 12:24

## 2022-12-10 RX ADMIN — Medication 975 MILLIGRAM(S): at 09:03

## 2022-12-10 RX ADMIN — Medication 975 MILLIGRAM(S): at 09:33

## 2022-12-10 RX ADMIN — Medication 975 MILLIGRAM(S): at 02:09

## 2022-12-10 RX ADMIN — Medication 600 MILLIGRAM(S): at 18:21

## 2022-12-10 RX ADMIN — Medication 975 MILLIGRAM(S): at 15:52

## 2022-12-10 RX ADMIN — Medication 600 MILLIGRAM(S): at 12:54

## 2022-12-10 NOTE — PROGRESS NOTE ADULT - ASSESSMENT
26yo  @ 37w1d, GBS neg, s/p cerclage, IOL for gHTN, on pitocin, s/p SROM, now in active labor, IUPC in place  -IUPC in place, inadequate contractions currently  -continuous efm & toco  -IV access   -IV hydration   -diet: clear liquid diet   -monitor vitals, currently normotensive  -continue pitocin, currently at 24U    Dr. Duran and Dr. De La Cruz aware
27y.o.  @ 37.2wks IOL for gHTN, s/p cerclage removal on , GBS negative  - Continue PItocin  - Continuous efm and toco  - Pain management PRN  - Dr. Roger tom
28yo  @ 37w1d, GBS neg, s/p cerclage, IOL for gHTN r/o preeclampsia, on pitocin, s/p SROM, now in active labor  -IUPC placed as SVE unchanged  -continuous efm & toco  -f/u Uprcr  -IV access   -IV hydration   -diet: clear liquid diet   -monitor vitals, currently normotensive  -continue pitocin, currently at 16U, will increase to 18U    Dr. Duran and Dr. De La Cruz aware  
28yo  @ 37w1d, GBS neg, s/p cerclage, IOL for gHTN, on pitocin, s/p SROM, now in active labor, IUPC in place  -IUPC in place, contractions currently  -continuous efm & toco  -IV access   -IV hydration   -diet: clear liquid diet   -monitor vitals, currently normotensive  -continue pitocin, currently at 24U    Dr. Duran and Dr. De La Cruz aware
A/P: 27y now P1, s/p primary LTCS for arrest of dilation, pod#1, recovering well   - pain management with PO pain meds   - monitor vitals/bleeding   - encourage incentive spirometry   - ambulation/PO hydration  - advance diet as tolerated   - simethicone  - SCDs for DVT prophylaxis   - f/u AM labs   - routine postop care     Dr. Luke and Dr. Duran aware. 
S/P CS POD #1  -Venous dopplers to R/O DVT today  -Continue pain management  -Ambulation encouraged.  
26yo  @ 37w1d, GBS neg, s/p cerclage, IOL for gHTN r/o preeclampsia, on pitocin, s/p SROM, now in active labor  -continous efm & toco  -f/u Uprcr  -IV access   -IV hydration   -diet: clear liquid diet   -monitor vitals, currently normotensive  -continue pitocin, currently at 10 units, will increase to 12 units    Dr. Duran and Dr. De La Cruz aware  
A/P: 27y now P1, s/p primary LTCS for arrest of dilation, POD0, gHTN  recovering well   - pain management with PO pain meds   - monitor vitals/bleeding   - encourage incentive spirometry   - ambulation/PO hydration  - advance diet as tolerated   - simethicone  - scds for DVT prophylaxis   - f/u AM labs   - routine postop care     Dr. Luke and Dr. Duran to be made aware. 
A/P: 27y now P1, s/p primary LTCS for arrest of dilation, pod#2, recovering well     - pain management with PO pain meds   - monitor vitals/bleeding   - encourage incentive spirometry   - ambulation/PO hydration  - advance diet as tolerated   - simethicone  - SCDs for DVT prophylaxis   - routine postop care     Dr. Richard and Dr. Duran to be aware.

## 2022-12-10 NOTE — PROGRESS NOTE ADULT - SUBJECTIVE AND OBJECTIVE BOX
PGY1 Note:  Section    POD#2. Pt seen and evaluated at bedside. Pain well controlled. Denies dizziness/lightheadedness/CP/SOB/palpitations. Denies fever, chills, nausea/vomiting, diarrhea, dysuria, LE pain. She would like to go home.    Ambulating: yes  Voiding: yes  Flatus: Yes  Bowel movements: No  Breast or bottle feeding: breast  Diet: tolerating regular diet     Physical Exam  Vital Signs Last 24 Hrs  T(C): 36.4 (10 Dec 2022 03:21), Max: 36.7 (09 Dec 2022 20:02)  T(F): 97.6 (10 Dec 2022 03:21), Max: 98 (09 Dec 2022 20:02)  HR: 106 (10 Dec 2022 03:21) (91 - 108)  BP: 131/76 (10 Dec 2022 03:21) (116/56 - 149/79)  RR: 18 (10 Dec 2022 03:21) (18 - 18)    Gen: AAOx3, NAD  Heart: RRR, S1S2+  Lungs: CTA B/L, no r/r/w   Fundus: firm, below umbilicus   Wound: Pfannenstiel incision, dermabond,, c/d/i  Abd: Soft, nontender, nondistended, BS+  Lochia: minimal   Ext: No calf tenderness, no swelling    Labs:                        11.3   11.44 )-----------( 218      ( 07 Dec 2022 16:30 )             35.4                         10.7   11.02 )-----------( 195      ( 03 Dec 2022 18:49 )             33.8      MEDICATIONS  (STANDING):  acetaminophen     Tablet .. 975 milliGRAM(s) Oral <User Schedule>  ibuprofen  Tablet. 600 milliGRAM(s) Oral every 6 hours  lactated ringers. 1000 milliLiter(s) (125 mL/Hr) IV Continuous <Continuous>    MEDICATIONS  (PRN):  diphenhydrAMINE 25 milliGRAM(s) Oral every 6 hours PRN Pruritus  lanolin Ointment 1 Application(s) Topical every 6 hours PRN Sore Nipples  magnesium hydroxide Suspension 30 milliLiter(s) Oral two times a day PRN Constipation  oxyCODONE    IR 5 milliGRAM(s) Oral every 3 hours PRN Moderate to Severe Pain (4-10)  oxyCODONE    IR 5 milliGRAM(s) Oral once PRN Moderate to Severe Pain (4-10)  simethicone 80 milliGRAM(s) Chew every 4 hours PRN Gas

## 2022-12-13 ENCOUNTER — APPOINTMENT (OUTPATIENT)
Dept: ANTEPARTUM | Facility: CLINIC | Age: 27
End: 2022-12-13

## 2022-12-16 DIAGNOSIS — Z20.822 CONTACT WITH AND (SUSPECTED) EXPOSURE TO COVID-19: ICD-10-CM

## 2022-12-16 DIAGNOSIS — Z28.09 IMMUNIZATION NOT CARRIED OUT BECAUSE OF OTHER CONTRAINDICATION: ICD-10-CM

## 2022-12-16 DIAGNOSIS — Z3A.37 37 WEEKS GESTATION OF PREGNANCY: ICD-10-CM

## 2022-12-20 ENCOUNTER — APPOINTMENT (OUTPATIENT)
Dept: ANTEPARTUM | Facility: CLINIC | Age: 27
End: 2022-12-20

## 2022-12-27 ENCOUNTER — APPOINTMENT (OUTPATIENT)
Dept: ANTEPARTUM | Facility: CLINIC | Age: 27
End: 2022-12-27

## 2022-12-28 NOTE — OB RN TRIAGE NOTE - SUICIDE SCREENING DEPRESSION
Received faxed refill request for Leflunomide. Last visit was 7/6/22 with one no show. No follow up scheduled at this time. Lab Results   Component Value Date/Time    Sodium 138 12/23/2022 12:52 PM    Potassium 3.7 12/23/2022 12:52 PM    Chloride 102 12/23/2022 12:52 PM    CO2 30 12/23/2022 12:52 PM    Anion gap 6 12/23/2022 12:52 PM    Glucose 92 12/23/2022 12:52 PM    BUN 31 (H) 12/23/2022 12:52 PM    Creatinine 4.55 (H) 12/23/2022 12:52 PM    BUN/Creatinine ratio 7 (L) 12/23/2022 12:52 PM    GFR est AA 14 (L) 09/28/2022 10:46 AM    GFR est non-AA 11 (L) 09/28/2022 10:46 AM    Calcium 9.6 12/23/2022 12:52 PM    Bilirubin, total 0.2 12/23/2022 12:52 PM    Alk.  phosphatase 31 (L) 12/23/2022 12:52 PM    Protein, total 7.3 12/23/2022 12:52 PM    Albumin 3.5 12/23/2022 12:52 PM    Globulin 3.8 12/23/2022 12:52 PM    A-G Ratio 0.9 (L) 12/23/2022 12:52 PM    ALT (SGPT) 23 12/23/2022 12:52 PM    AST (SGOT) 14 (L) 12/23/2022 12:52 PM     Lab Results   Component Value Date/Time    WBC 6.4 12/23/2022 12:52 PM    HGB 10.8 (L) 12/23/2022 12:52 PM    HCT 33.6 (L) 12/23/2022 12:52 PM    PLATELET 719 18/47/3792 12:52 PM    MCV 94.1 12/23/2022 12:52 PM Negative

## 2023-01-23 NOTE — DISCUSSION/SUMMARY
[FreeTextEntry1] : 2022\par MFM attending with PGY 3 f/u consult note\par \par 27yo  at 36w3d, ALEX 2022 by LMP (3/23/2022) c/w early ultrasound, s/p emergency cerclage on . Co-managed with Dr. Duran. \par \par Patient was seen in L&D yesterday for contraction pain, tension palpated on cerclage, it was removed. Patient was kept the entire day for painful contractions then discharged at night time. Patient returned to L&D around 0200 again with contraction pain, s/p therapeutic rest. She was checked multiple times, with no cervical change (SVE 3/80/-3).\par \par Right now patient reports improvement of pain, endorses some left sided cramping. Denies LOF, vaginal bleeding. Reports good fetal movement. \par \par Today's appointment was for cerclage removal.\par \par ALEX: 22 \par 8/10: Speculum: appeared dilated, bulging membrane \par  TVUS: membranes coming through the external os, 3cm dilation \par  BSS: cephalic, posterior placenta, FH 173bpm, gross fetal movement noted \par : Anatomic survey limited by maternal body habitus (BMI 38). \par  SFVtx at 21w5d, plac posterior, not low-lying. AFVol 5.8cm DVP; EFW 476g at 65%ile for GA. \par  No structural anomalies identified. \par 11/15: 34w0d, 2412g (55%), MVP 6.21cm, vertex, post placenta\par \par OBHx: primigravid \par GynHx: denies h/o ovarian cysts, fibroids, abnormal pap smears, STIs \par PMHx: \par mild intermittent asthma, denies h/o hospitalizations or intubation \par PSHx: \par Hysteroscopy for uterine polyp \par cerclage placement \par Meds: PNVs \par All: NKDA \par SocHx: denies \par  \par Gen: AOx3, NAD\par abd: gravid, nontender\par \par A/P: 27yo  at 36w2d, ALEX 2022 by LMP (3/23/2022) c/w early ultrasound, s/p cerclage removal on , Co-managed with Dr. Duran. \par \par 1. Cervical Insufficiency: Ms Siu presented with painless dilation, classic for cervical insufficiency. \par - s/p cerclage removal on \par \par 2. Pregnancy \par - Co managed with Dr. Duran\par - S/p therapeutic rest\par - Labor precautions given\par \par  MD Tito, FACOG with MD Ortega, PGY-3\par

## 2023-03-15 NOTE — OB RN PATIENT PROFILE - NSICDXPASTMEDICALHX_GEN_ALL_CORE_FT
Final Anesthesia Post-op Assessment    Patient: Fletcher Watts  Procedure(s) Performed: RIGHT EAR FOREIGN BODY REMOVAL  Anesthesia type: General    Vitals Value Taken Time   Temp 36.7 03/15/23 1043   Pulse 97 03/15/23 1043   Resp 15 03/15/23 1043   SpO2 98 % 03/15/23 1042   /87 03/15/23 1037   Vitals shown include unvalidated device data.      Patient Location: PACU Phase 1  Post-op Vital Signs:stable  Level of Consciousness: awake and alert  Respiratory Status: spontaneous ventilation  Cardiovascular stable  Hydration: euvolemic  Pain Management: adequately controlled  Handoff: Handoff to receiving clinician was performed and questions were answered  Vomiting: none  Nausea: None  Airway Patency:patent  Post-op Assessment: no complications and patient tolerated procedure well with no complications      No notable events documented.   
PAST MEDICAL HISTORY:  Mild asthma No h/o hospitalizations or intubations, exascerbated by seasonal allergies

## 2023-04-24 NOTE — OB RN TRIAGE NOTE - FALL HARM RISK - CONCLUSION
S: Patient is a 66 year old, female seen today in follow up for left knee.    They were previously evaluated on 23,  they are  days out from injury.  Since this visit they report patient states glenn the cortisone from last visit is still helping, but feels they are starting to wear of. She is still unable to fully straighten her knee.    Current pain level: 0/10, Worst pain level: 4/10.                 Patient Active Problem List   Diagnosis     Aortic valve replaced     Benign essential hypertension     Advanced directives, counseling/discussion     Long-term (current) use of anticoagulants [Z79.01]     Osteopenia of multiple sites     Hyperthyroidism     Adenomatous polyp of colon, unspecified part of colon     Personal history of malignant neoplasm of breast            Past Medical History:   Diagnosis Date     Adenomatous colon polyp 2015     Aortic stenosis 2011    bicuspid AV with severe stenosis - 2011 mechanical AVR with 23 mm [employer] Y[employer] AV conduit, composite graft placement     Arthritis     knees and hands     Bicuspid aortic valve      Breast cancer (H) 2014    R breast     H/O aortic valve replacement     St Yordan     Heart murmur     Valve repalcement .     History of blood transfusion     Unsure with Aortic valve replacement     HTN, goal below 140/90      Hx of repair of dissecting aneurysm of ascending thoracic aorta 2011    AAA repair with av23 mm tube graft     Palpitations      PONV (postoperative nausea and vomiting)     n/v morphine vs anesthesia, vomiting x24 hrs     Subclinical hyperthyroidism 2017     Thrombosis of leg     after  of daughter     Uncomplicated asthma             Past Surgical History:   Procedure Laterality Date     BIOPSY NODE SENTINEL Right 09/10/2014    Procedure: BIOPSY NODE SENTINEL;  Surgeon: Ila Romano MD;  Location: [facility] OR     CARDIAC SURGERY  2011    aortic valve replacement     ENT SURGERY      tonsillectomy     [facility]  "PORT A CATH       INSERT PORT VASCULAR ACCESS Left 10/22/2014    Procedure: INSERT PORT VASCULAR ACCESS;  Surgeon: Ila Romano MD;  Location: [facility] OR     LUMPECTOMY BREAST WITH SEED LOCALIZATION Right 09/10/2014    Procedure: LUMPECTOMY BREAST WITH SEED LOCALIZATION;  Surgeon: Ila Romano MD;  Location: [facility] OR     ORTHOPEDIC SURGERY      shoulder, thumb surgery     REMOVE PORT VASCULAR ACCESS Left 04/08/2015    Procedure: REMOVE PORT VASCULAR ACCESS;  Surgeon: Ila Romano MD;  Location: [facility] OR     REPAIR ANEURYSM ASCENDING AORTA  06/23/2011    Procedure:REPAIR ANEURYSM ASCENDING AORTA; Medial Sternotomy, Aortic Valve Replacement, ([employer] HP Valved Graft, size 23 mm) on pump oxygenation, intraoperative transesophageal cardiogram; Surgeon:JOHN PAUL ULLOA; Location:[facility] OR     REPLACE VALVE AORTIC  06/23/2011    bicuspid AV with severe stenosis - 6/2011 mechanical AVR with 23 mm [employer] Yordan AV conduit, composite graft placement            Social History     Tobacco Use     Smoking status: Never     Smokeless tobacco: Never   Vaping Use     Vaping status: Never Used     Passive vaping exposure: Yes   Substance Use Topics     Alcohol use: Yes     Comment: 2 drinks per week -             Family History   Problem Relation Age of Onset     Hypertension Mother      Thyroid Disease Mother         \"Toxic thyroid\"     Prostate Cancer Father 70     Cancer Father 80        Lung cancer, Not caused from smoking     Cerebrovascular Disease Father 80     Hypertension Father      Cardiovascular Brother         half brother      Cardiovascular Son         Puentes-Parkinsons White Syndrome     Cardiovascular Daughter         Heart murmur     Breast Cancer Paternal Aunt 80     Cardiovascular Paternal Aunt      Arthritis Brother 40        RA - half brother      Cancer Maternal Grandfather      Colon Cancer No family hx of                Allergies   Allergen Reactions     Morphine Sulfate " Nausea and Vomiting            Current Outpatient Medications   Medication Sig Dispense Refill     alendronate (FOSAMAX) 70 MG tablet Take 70 mg by mouth every 7 days       anastrozole (ARIMIDEX) 1 MG tablet Take by mouth daily 30 tablet      ASPIRIN EC PO Take 81 mg by mouth daily       carvedilol (COREG) 12.5 MG tablet Take 1 tablet (12.5 mg) by mouth 2 times daily (with meals) 180 tablet 3     lisinopril (ZESTRIL) 10 MG tablet Take 1 tablet (10 mg) by mouth 2 times daily 180 tablet 3     methimazole (TAPAZOLE) 5 MG tablet TAKE ONE-HALF (1/2) TABLET DAILY 45 tablet 3     solifenacin (VESICARE) 10 MG tablet Take 1 tablet (10 mg) by mouth daily 30 tablet 3     warfarin ANTICOAGULANT (COUMADIN) 5 MG tablet TAKE 1 TABLET DAILY, EXCEPT ONE AND ONE-HALF TABLETS ON MONDAY AND FRIDAY OR AS DIRECTED BY THE INR CLINIC 115 tablet 1     zolpidem (AMBIEN) 5 MG tablet Take 0.5 tablets (2.5 mg) by mouth nightly as needed for sleep 30 tablet 0          Review Of Systems  Skin: negative  Eyes: negative  Ears/Nose/Throat: negative  Respiratory: No shortness of breath, dyspnea on exertion, cough, or hemoptysis    O: Physical exam:   Pain to palpation medial and lateral joint line each knee and parapatellar bilateral. Not able to fully extend left knee.  Adequate flexion each knee.  CMS intact each lower extremity.    Labs: need to update Vit D    Images:  BONE DENSITOMETRY  [facility] [address] [place] [address]  2022      PATIENT: Annabella Bolanos  CHART: [id number]   :  1956  AGE:  66 year old  SEX:  female   REFERRING PROVIDER:  Lucille Felder MD     PROCEDURE:  Bone density scanning was performed using DXA technology of the lumbar spine and hip.  Scanning was performed on a Lunar Prodigy scanner.  Reporting is completed in the form of a T-score.  The T-score represents the standard deviation from peak bone mass based on a young healthy adult.     REFERENCE T-SCORES:        Normal                -1.0 and greater                                 Osteopenia         Between -1.0 and -2.5                                           Osteoporosis     -2.5 and less                                       RISK FACTORS:  Post-menopausal, Height loss of 2.75 inches, Parent history of osteoporosis, Parent history of a hip fracture, Condition related to bone loss: breast cancer therapy: arimidex and hyperthyroidism, follow up osteopenia     CURRENT TREATMENT:  Calcium with Vitamin D, Fosamax (alendronate)     FINDINGS:               Lumbar Spine (L1-L2)      T-score: -0.7, marked degenerative changes present at L3,4, so only L1,2 are evaluated.                Left Femoral Neck            T-score:  -1.1               Right Femoral Neck          T-score:  -1.1                  Lumbar (L1-L2) BMD: 1.086  Previous: 1.070                         Total Hip Mean BMD: 0.830  Previous: 0.854     Comparison is made to another DXA performed on the same Lunar Prodigy  machine on 09/04/2020.        IMPRESSION  Low Bone Density (osteopenia).  Degenerative changes at the lumbar spine may falsely elevate results.      There has been no significant change in bone density of the lumbar spine. There has been no significant change in bone density of the hip(s).      Recommendations include ensuring adequate Calcium and Vitamin D.     Follow up can be considered in 3 years.     KNEE THREE VIEWS BILATERAL  11/9/2022 8:33 AM      HISTORY: Chronic pain of left knee; Chronic pain of right knee  COMPARISON: 7/20/2016                                                                      IMPRESSION:     Right knee: No acute fracture or malalignment. Severe lateral  compartment degenerative changes with bone-on-bone articulation.  Moderate patellofemoral and mild medial compartment degenerative  changes. Moderate knee joint effusion. Atherosclerosis.     Left knee: No acute fracture or malalignment. Moderate to severe  medial  compartment degenerative changes. Moderate patellofemoral  compartment degenerative changes. Moderate knee joint effusion.  Atherosclerosis.         A: OA both knees    P:  Patient and I discussed pathology and interventions.  She'd like to contemplate and will let us know how she'd like to proceed.  See back 3-6 mos           In addition to the above assessment and plan each active problem on Annabella's problem list was evaluated today. This included the questioning of Annabella for any medication problems. We will continue the current treatment plan for these active problems except as noted.     Universal Safety Interventions

## 2023-12-12 NOTE — OB PROVIDER H&P - NS_CONSENTSAPP_OBGYN_ALL_OB
Prednisone Counseling:  I discussed with the patient the risks of prolonged use of prednisone including but not limited to weight gain, insomnia, osteoporosis, mood changes, diabetes, susceptibility to infection, glaucoma and high blood pressure.  In cases where prednisone use is prolonged, patients should be monitored with blood pressure checks, serum glucose levels and an eye exam.  Additionally, the patient may need to be placed on GI prophylaxis, PCP prophylaxis, and calcium and vitamin D supplementation and/or a bisphosphonate.  The patient verbalized understanding of the proper use and the possible adverse effects of prednisone.  All of the patient's questions and concerns were addressed. Azathioprine Pregnancy And Lactation Text: This medication is Pregnancy Category D and isn't considered safe during pregnancy. It is unknown if this medication is excreted in breast milk. Drysol Pregnancy And Lactation Text: This medication is considered safe during pregnancy and breast feeding. Bexarotene Pregnancy And Lactation Text: This medication is Pregnancy Category X and should not be given to women who are pregnant or may become pregnant. This medication should not be used if you are breast feeding. SSKI Counseling:  I discussed with the patient the risks of SSKI including but not limited to thyroid abnormalities, metallic taste, GI upset, fever, headache, acne, arthralgias, paraesthesias, lymphadenopathy, easy bleeding, arrhythmias, and allergic reaction. Olanzapine Pregnancy And Lactation Text: This medication is pregnancy category C.   There are no adequate and well controlled trials with olanzapine in pregnant females.  Olanzapine should be used during pregnancy only if the potential benefit justifies the potential risk to the fetus.   In a study in lactating healthy women, olanzapine was excreted in breast milk.  It is recommended that women taking olanzapine should not breast feed. Fluconazole Pregnancy And Lactation Text: This medication is Pregnancy Category C and it isn't know if it is safe during pregnancy. It is also excreted in breast milk. Wartpeel Pregnancy And Lactation Text: This medication is Pregnancy Category X and contraindicated in pregnancy and in women who may become pregnant. It is unknown if this medication is excreted in breast milk. Picato Pregnancy And Lactation Text: This medication is Pregnancy Category C. It is unknown if this medication is excreted in breast milk. Klisyri Counseling:  I discussed with the patient the risks of Klisyri including but not limited to erythema, scaling, itching, weeping, crusting, and pain. Dupixent Counseling: I discussed with the patient the risks of dupilumab including but not limited to eye infection and irritation, cold sores, injection site reactions, worsening of asthma, allergic reactions and increased risk of parasitic infection.  Live vaccines should be avoided while taking dupilumab. Dupilumab will also interact with certain medications such as warfarin and cyclosporine. The patient understands that monitoring is required and they must alert us or the primary physician if symptoms of infection or other concerning signs are noted. Infliximab Pregnancy And Lactation Text: This medication is Pregnancy Category B and is considered safe during pregnancy. It is unknown if this medication is excreted in breast milk. Stelara Counseling:  I discussed with the patient the risks of ustekinumab including but not limited to immunosuppression, malignancy, posterior leukoencephalopathy syndrome, and serious infections.  The patient understands that monitoring is required including a PPD at baseline and must alert us or the primary physician if symptoms of infection or other concerning signs are noted. Hydroxychloroquine Pregnancy And Lactation Text: This medication has been shown to cause fetal harm but it isn't assigned a Pregnancy Risk Category. There are small amounts excreted in breast milk. Oral Minoxidil Counseling- I discussed with the patient the risks of oral minoxidil including but not limited to shortness of breath, swelling of the feet or ankles, dizziness, lightheadedness, unwanted hair growth and allergic reaction.  The patient verbalized understanding of the proper use and possible adverse effects of oral minoxidil.  All of the patient's questions and concerns were addressed. Topical Ketoconazole Pregnancy And Lactation Text: This medication is Pregnancy Category B and is considered safe during pregnancy. It is unknown if it is excreted in breast milk. Rinvoq Pregnancy And Lactation Text: Based on animal studies, Rinvoq may cause embryo-fetal harm when administered to pregnant women.  The medication should not be used in pregnancy.  Breastfeeding is not recommended during treatment and for 6 days after the last dose. Ivermectin Counseling:  Patient instructed to take medication on an empty stomach with a full glass of water.  Patient informed of potential adverse effects including but not limited to nausea, diarrhea, dizziness, itching, and swelling of the extremities or lymph nodes.  The patient verbalized understanding of the proper use and possible adverse effects of ivermectin.  All of the patient's questions and concerns were addressed. Elidel Counseling: Patient may experience a mild burning sensation during topical application. Elidel is not approved in children less than 2 years of age. There have been case reports of hematologic and skin malignancies in patients using topical calcineurin inhibitors although causality is questionable. Cimetidine Counseling:  I discussed with the patient the risks of Cimetidine including but not limited to gynecomastia, headache, diarrhea, nausea, drowsiness, arrhythmias, pancreatitis, skin rashes, psychosis, bone marrow suppression and kidney toxicity. Soolantra Counseling: I discussed with the patients the risks of topial Soolantra. This is a medicine which decreases the number of mites and inflammation in the skin. You experience burning, stinging, eye irritation or allergic reactions.  Please call our office if you develop any problems from using this medication. Xolair Pregnancy And Lactation Text: This medication is Pregnancy Category B and is considered safe during pregnancy. This medication is excreted in breast milk. Cephalexin Counseling: I counseled the patient regarding use of cephalexin as an antibiotic for prophylactic and/or therapeutic purposes. Cephalexin (commonly prescribed under brand name Keflex) is a cephalosporin antibiotic which is active against numerous classes of bacteria, including most skin bacteria. Side effects may include nausea, diarrhea, gastrointestinal upset, rash, hives, yeast infections, and in rare cases, hepatitis, kidney disease, seizures, fever, confusion, neurologic symptoms, and others. Patients with severe allergies to penicillin medications are cautioned that there is about a 10% incidence of cross-reactivity with cephalosporins. When possible, patients with penicillin allergies should use alternatives to cephalosporins for antibiotic therapy. Dutasteride Pregnancy And Lactation Text: This medication is absolutely contraindicated in women, especially during pregnancy and breast feeding. Feminization of male fetuses is possible if taking while pregnant. Opioid Counseling: I discussed with the patient the potential side effects of opioids including but not limited to addiction, altered mental status, and depression. I stressed avoiding alcohol, benzodiazepines, muscle relaxants and sleep aids unless specifically okayed by a physician. The patient verbalized understanding of the proper use and possible adverse effects of opioids. All of the patient's questions and concerns were addressed. They were instructed to flush the remaining pills down the toilet if they did not need them for pain. Detail Level: Simple Isotretinoin Counseling: Patient should get monthly blood tests, not donate blood, not drive at night if vision affected, not share medication, and not undergo elective surgery for 6 months after tx completed. Side effects reviewed, pt to contact office should one occur. Cephalexin Pregnancy And Lactation Text: This medication is Pregnancy Category B and considered safe during pregnancy.  It is also excreted in breast milk but can be used safely for shorter doses. Soolantra Pregnancy And Lactation Text: This medication is Pregnancy Category C. This medication is considered safe during breast feeding. Griseofulvin Counseling:  I discussed with the patient the risks of griseofulvin including but not limited to photosensitivity, cytopenia, liver damage, nausea/vomiting and severe allergy.  The patient understands that this medication is best absorbed when taken with a fatty meal (e.g., ice cream or french fries). Protopic Counseling: Patient may experience a mild burning sensation during topical application. Protopic is not approved in children less than 2 years of age. There have been case reports of hematologic and skin malignancies in patients using topical calcineurin inhibitors although causality is questionable. Winlevi Counseling:  I discussed with the patient the risks of topical clascoterone including but not limited to erythema, scaling, itching, and stinging. Patient voiced their understanding. Sski Pregnancy And Lactation Text: This medication is Pregnancy Category D and isn't considered safe during pregnancy. It is excreted in breast milk. Klisyri Pregnancy And Lactation Text: It is unknown if this medication can harm a developing fetus or if it is excreted in breast milk. Dupixent Pregnancy And Lactation Text: This medication likely crosses the placenta but the risk for the fetus is uncertain. This medication is excreted in breast milk. Cellcept Counseling:  I discussed with the patient the risks of mycophenolate mofetil including but not limited to infection/immunosuppression, GI upset, hypokalemia, hypercholesterolemia, bone marrow suppression, lymphoproliferative disorders, malignancy, GI ulceration/bleed/perforation, colitis, interstitial lung disease, kidney failure, progressive multifocal leukoencephalopathy, and birth defects.  The patient understands that monitoring is required including a baseline creatinine and regular CBC testing. In addition, patient must alert us immediately if symptoms of infection or other concerning signs are noted. Rituxan Counseling:  I discussed with the patient the risks of Rituxan infusions. Side effects can include infusion reactions, severe drug rashes including mucocutaneous reactions, reactivation of latent hepatitis and other infections and rarely progressive multifocal leukoencephalopathy.  All of the patient's questions and concerns were addressed. Prednisone Pregnancy And Lactation Text: This medication is Pregnancy Category C and it isn't know if it is safe during pregnancy. This medication is excreted in breast milk. Oral Minoxidil Pregnancy And Lactation Text: This medication should only be used when clearly needed if you are pregnant, attempting to become pregnant or breast feeding. Sotyktu Counseling:  I discussed the most common side effects of Sotyktu including: common cold, sore throat, sinus infections, cold sores, canker sores, folliculitis, and acne.  I also discussed more serious side effects of Sotyktu including but not limited to: serious allergic reactions; increased risk for infections such as TB; cancers such as lymphomas; rhabdomyolysis and elevated CPK; and elevated triglycerides and liver enzymes.  Rifampin Counseling: I discussed with the patient the risks of rifampin including but not limited to liver damage, kidney damage, red-orange body fluids, nausea/vomiting and severe allergy. Erivedge Counseling- I discussed with the patient the risks of Erivedge including but not limited to nausea, vomiting, diarrhea, constipation, weight loss, changes in the sense of taste, decreased appetite, muscle spasms, and hair loss.  The patient verbalized understanding of the proper use and possible adverse effects of Erivedge.  All of the patient's questions and concerns were addressed. Thalidomide Counseling: I discussed with the patient the risks of thalidomide including but not limited to birth defects, anxiety, weakness, chest pain, dizziness, cough and severe allergy. Winlevi Pregnancy And Lactation Text: This medication is considered safe during pregnancy and breastfeeding. Enbrel Counseling:  I discussed with the patient the risks of etanercept including but not limited to myelosuppression, immunosuppression, autoimmune hepatitis, demyelinating diseases, lymphoma, and infections.  The patient understands that monitoring is required including a PPD at baseline and must alert us or the primary physician if symptoms of infection or other concerning signs are noted. Ivermectin Pregnancy And Lactation Text: This medication is Pregnancy Category C and it isn't known if it is safe during pregnancy. It is also excreted in breast milk. Opioid Pregnancy And Lactation Text: These medications can lead to premature delivery and should be avoided during pregnancy. These medications are also present in breast milk in small amounts. Finasteride Male Counseling: Finasteride Counseling:  I discussed with the patient the risks of use of finasteride including but not limited to decreased libido, decreased ejaculate volume, gynecomastia, and depression. Women should not handle medication.  All of the patient's questions and concerns were addressed. Cimetidine Pregnancy And Lactation Text: This medication is Pregnancy Category B and is considered safe during pregnancy. It is also excreted in breast milk and breast feeding isn't recommended. Colchicine Pregnancy And Lactation Text: This medication is Pregnancy Category C and isn't considered safe during pregnancy. It is excreted in breast milk. Benzoyl Peroxide Counseling: Patient counseled that medicine may cause skin irritation and bleach clothing.  In the event of skin irritation, the patient was advised to reduce the amount of the drug applied or use it less frequently.   The patient verbalized understanding of the proper use and possible adverse effects of benzoyl peroxide.  All of the patient's questions and concerns were addressed. Low Dose Naltrexone Counseling- I discussed with the patient the potential risks and side effects of low dose naltrexone including but not limited to: more vivid dreams, headaches, nausea, vomiting, abdominal pain, fatigue, dizziness, and anxiety. Topical Metronidazole Counseling: Metronidazole is a topical antibiotic medication. You may experience burning, stinging, redness, or allergic reactions.  Please call our office if you develop any problems from using this medication. Minoxidil Counseling: Minoxidil is a topical medication which can increase blood flow where it is applied. It is uncertain how this medication increases hair growth. Side effects are uncommon and include stinging and allergic reactions. Finasteride Pregnancy And Lactation Text: This medication is absolutely contraindicated during pregnancy. It is unknown if it is excreted in breast milk. Isotretinoin Pregnancy And Lactation Text: This medication is Pregnancy Category X and is considered extremely dangerous during pregnancy. It is unknown if it is excreted in breast milk. Dapsone Counseling: I discussed with the patient the risks of dapsone including but not limited to hemolytic anemia, agranulocytosis, rashes, methemoglobinemia, kidney failure, peripheral neuropathy, headaches, GI upset, and liver toxicity.  Patients who start dapsone require monitoring including baseline LFTs and weekly CBCs for the first month, then every month thereafter.  The patient verbalized understanding of the proper use and possible adverse effects of dapsone.  All of the patient's questions and concerns were addressed. Topical Retinoid counseling:  Patient advised to apply a pea-sized amount only at bedtime and wait 30 minutes after washing their face before applying.  If too drying, patient may add a non-comedogenic moisturizer. The patient verbalized understanding of the proper use and possible adverse effects of retinoids.  All of the patient's questions and concerns were addressed. Rituxan Pregnancy And Lactation Text: This medication is Pregnancy Category C and it isn't know if it is safe during pregnancy. It is unknown if this medication is excreted in breast milk but similar antibodies are known to be excreted. Include Pregnancy/Lactation Warning?: No Rifampin Pregnancy And Lactation Text: This medication is Pregnancy Category C and it isn't know if it is safe during pregnancy. It is also excreted in breast milk and should not be used if you are breast feeding. Taltz Counseling: I discussed with the patient the risks of ixekizumab including but not limited to immunosuppression, serious infections, worsening of inflammatory bowel disease and drug reactions.  The patient understands that monitoring is required including a PPD at baseline and must alert us or the primary physician if symptoms of infection or other concerning signs are noted. Griseofulvin Pregnancy And Lactation Text: This medication is Pregnancy Category X and is known to cause serious birth defects. It is unknown if this medication is excreted in breast milk but breast feeding should be avoided. Protopic Pregnancy And Lactation Text: This medication is Pregnancy Category C. It is unknown if this medication is excreted in breast milk when applied topically. Doxepin Counseling:  Patient advised that the medication is sedating and not to drive a car after taking this medication. Patient informed of potential adverse effects including but not limited to dry mouth, urinary retention, and blurry vision.  The patient verbalized understanding of the proper use and possible adverse effects of doxepin.  All of the patient's questions and concerns were addressed. Adbry Counseling: I discussed with the patient the risks of tralokinumab including but not limited to eye infection and irritation, cold sores, injection site reactions, worsening of asthma, allergic reactions and increased risk of parasitic infection.  Live vaccines should be avoided while taking tralokinumab. The patient understands that monitoring is required and they must alert us or the primary physician if symptoms of infection or other concerning signs are noted. Siliq Counseling:  I discussed with the patient the risks of Siliq including but not limited to new or worsening depression, suicidal thoughts and behavior, immunosuppression, malignancy, posterior leukoencephalopathy syndrome, and serious infections.  The patient understands that monitoring is required including a PPD at baseline and must alert us or the primary physician if symptoms of infection or other concerning signs are noted. There is also a special program designed to monitor depression which is required with Siliq. Taltz Pregnancy And Lactation Text: The risk during pregnancy and breastfeeding is uncertain with this medication. Erivedge Pregnancy And Lactation Text: This medication is Pregnancy Category X and is absolutely contraindicated during pregnancy. It is unknown if it is excreted in breast milk. VTAMA Counseling: I discussed with the patient that VTAMA is not for use in the eyes, mouth or mouth. They should call the office if they develop any signs of allergic reactions to VTAMA. The patient verbalized understanding of the proper use and possible adverse effects of VTAMA.  All of the patient's questions and concerns were addressed. Arava Counseling:  Patient counseled regarding adverse effects of Arava including but not limited to nausea, vomiting, abnormalities in liver function tests. Patients may develop mouth sores, rash, diarrhea, and abnormalities in blood counts. The patient understands that monitoring is required including LFTs and blood counts.  There is a rare possibility of scarring of the liver and lung problems that can occur when taking methotrexate. Persistent nausea, loss of appetite, pale stools, dark urine, cough, and shortness of breath should be reported immediately. Patient advised to discontinue Arava treatment and consult with a physician prior to attempting conception. The patient will have to undergo a treatment to eliminate Arava from the body prior to conception. Benzoyl Peroxide Pregnancy And Lactation Text: This medication is Pregnancy Category C. It is unknown if benzoyl peroxide is excreted in breast milk. Clindamycin Counseling: I counseled the patient regarding use of clindamycin as an antibiotic for prophylactic and/or therapeutic purposes. Clindamycin is active against numerous classes of bacteria, including skin bacteria. Side effects may include nausea, diarrhea, gastrointestinal upset, rash, hives, yeast infections, and in rare cases, colitis. N/A Low Dose Naltrexone Pregnancy And Lactation Text: Naltrexone is pregnancy category C.  There have been no adequate and well-controlled studies in pregnant women.  It should be used in pregnancy only if the potential benefit justifies the potential risk to the fetus.   Limited data indicates that naltrexone is minimally excreted into breastmilk. Sotyktu Pregnancy And Lactation Text: There is insufficient data to evaluate whether or not Sotyktu is safe to use during pregnancy.   It is not known if Sotyktu passes into breast milk and whether or not it is safe to use when breastfeeding.   Cibinqo Counseling: I discussed with the patient the risks of Cibinqo therapy including but not limited to common cold, nausea, headache, cold sores, increased blood CPK levels, dizziness, UTIs, fatigue, acne, and vomitting. Live vaccines should be avoided.  This medication has been linked to serious infections; higher rate of mortality; malignancy and lymphoproliferative disorders; major adverse cardiovascular events; thrombosis; thrombocytopenia and lymphopenia; lipid elevations; and retinal detachment. Eucrisa Counseling: Patient may experience a mild burning sensation during topical application. Eucrisa is not approved in children less than 2 years of age. Otezla Counseling: The side effects of Otezla were discussed with the patient, including but not limited to worsening or new depression, weight loss, diarrhea, nausea, upper respiratory tract infection, and headache. Patient instructed to call the office should any adverse effect occur.  The patient verbalized understanding of the proper use and possible adverse effects of Otezla.  All the patient's questions and concerns were addressed. Topical Metronidazole Pregnancy And Lactation Text: This medication is Pregnancy Category B and considered safe during pregnancy.  It is also considered safe to use while breastfeeding. High Dose Vitamin A Counseling: Side effects reviewed, pt to contact office should one occur. Erythromycin Pregnancy And Lactation Text: This medication is Pregnancy Category B and is considered safe during pregnancy. It is also excreted in breast milk. Carac Counseling:  I discussed with the patient the risks of Carac including but not limited to erythema, scaling, itching, weeping, crusting, and pain. Itraconazole Counseling:  I discussed with the patient the risks of itraconazole including but not limited to liver damage, nausea/vomiting, neuropathy, and severe allergy.  The patient understands that this medication is best absorbed when taken with acidic beverages such as non-diet cola or ginger ale.  The patient understands that monitoring is required including baseline LFTs and repeat LFTs at intervals.  The patient understands that they are to contact us or the primary physician if concerning signs are noted. Birth Control Pills Counseling: Birth Control Pill Counseling: I discussed with the patient the potential side effects of OCPs including but not limited to increased risk of stroke, heart attack, thrombophlebitis, deep venous thrombosis, hepatic adenomas, breast changes, GI upset, headaches, and depression.  The patient verbalized understanding of the proper use and possible adverse effects of OCPs. All of the patient's questions and concerns were addressed. Qbrexza Counseling:  I discussed with the patient the risks of Qbrexza including but not limited to headache, mydriasis, blurred vision, dry eyes, nasal dryness, dry mouth, dry throat, dry skin, urinary hesitation, and constipation.  Local skin reactions including erythema, burning, stinging, and itching can also occur. Xeljanz Counseling: I discussed with the patient the risks of Xeljanz therapy including increased risk of infection, liver issues, headache, diarrhea, or cold symptoms. Live vaccines should be avoided. They were instructed to call if they have any problems. Dapsone Pregnancy And Lactation Text: This medication is Pregnancy Category C and is not considered safe during pregnancy or breast feeding. Niacinamide Counseling: I recommended taking niacin or niacinamide, also know as vitamin B3, twice daily. Recent evidence suggests that taking vitamin B3 (500 mg twice daily) can reduce the risk of actinic keratoses and non-melanoma skin cancers. Side effects of vitamin B3 include flushing and headache. Minoxidil Pregnancy And Lactation Text: This medication has not been assigned a Pregnancy Risk Category but animal studies failed to show danger with the topical medication. It is unknown if the medication is excreted in breast milk. Cibinqo Pregnancy And Lactation Text: It is unknown if this medication will adversely affect pregnancy or breast feeding.  You should not take this medication if you are currently pregnant or planning a pregnancy or while breastfeeding. Sarecycline Counseling: Patient advised regarding possible photosensitivity and discoloration of the teeth, skin, lips, tongue and gums.  Patient instructed to avoid sunlight, if possible.  When exposed to sunlight, patients should wear protective clothing, sunglasses, and sunscreen.  The patient was instructed to call the office immediately if the following severe adverse effects occur:  hearing changes, easy bruising/bleeding, severe headache, or vision changes.  The patient verbalized understanding of the proper use and possible adverse effects of sarecycline.  All of the patient's questions and concerns were addressed. Cyclophosphamide Counseling:  I discussed with the patient the risks of cyclophosphamide including but not limited to hair loss, hormonal abnormalities, decreased fertility, abdominal pain, diarrhea, nausea and vomiting, bone marrow suppression and infection. The patient understands that monitoring is required while taking this medication. Libtayo Counseling- I discussed with the patient the risks of Libtayo including but not limited to nausea, vomiting, diarrhea, and bone or muscle pain.  The patient verbalized understanding of the proper use and possible adverse effects of Libtayo.  All of the patient's questions and concerns were addressed. Adbry Pregnancy And Lactation Text: It is unknown if this medication will adversely affect pregnancy or breast feeding. Vtama Pregnancy And Lactation Text: It is unknown if this medication can cause problems during pregnancy and breastfeeding. Tranexamic Acid Counseling:  Patient advised of the small risk of bleeding problems with tranexamic acid. They were also instructed to call if they developed any nausea, vomiting or diarrhea. All of the patient's questions and concerns were addressed. Humira Counseling:  I discussed with the patient the risks of adalimumab including but not limited to myelosuppression, immunosuppression, autoimmune hepatitis, demyelinating diseases, lymphoma, and serious infections.  The patient understands that monitoring is required including a PPD at baseline and must alert us or the primary physician if symptoms of infection or other concerning signs are noted. Cyclophosphamide Pregnancy And Lactation Text: This medication is Pregnancy Category D and it isn't considered safe during pregnancy. This medication is excreted in breast milk. Otezla Pregnancy And Lactation Text: This medication is Pregnancy Category C and it isn't known if it is safe during pregnancy. It is unknown if it is excreted in breast milk. Topical Steroids Counseling: I discussed with the patient that prolonged use of topical steroids can result in the increased appearance of superficial blood vessels (telangiectasias), lightening (hypopigmentation) and thinning of the skin (atrophy).  Patient understands to avoid using high potency steroids in skin folds, the groin or the face.  The patient verbalized understanding of the proper use and possible adverse effects of topical steroids.  All of the patient's questions and concerns were addressed. Clindamycin Pregnancy And Lactation Text: This medication can be used in pregnancy if certain situations. Clindamycin is also present in breast milk. Doxepin Pregnancy And Lactation Text: This medication is Pregnancy Category C and it isn't known if it is safe during pregnancy. It is also excreted in breast milk and breast feeding isn't recommended. Erythromycin Counseling:  I discussed with the patient the risks of erythromycin including but not limited to GI upset, allergic reaction, drug rash, diarrhea, increase in liver enzymes, and yeast infections. Litfulo Counseling: I discussed with the patient the risks of Litfulo therapy including but not limited to upper respiratory tract infections, shingles, cold sores, and nausea. Live vaccines should be avoided.  This medication has been linked to serious infections; higher rate of mortality; malignancy and lymphoproliferative disorders; major adverse cardiovascular events; thrombosis; gastrointestinal perforations; neutropenia; lymphopenia; anemia; liver enzyme elevations; and lipid elevations. Doxycycline Counseling:  Patient counseled regarding possible photosensitivity and increased risk for sunburn.  Patient instructed to avoid sunlight, if possible.  When exposed to sunlight, patients should wear protective clothing, sunglasses, and sunscreen.  The patient was instructed to call the office immediately if the following severe adverse effects occur:  hearing changes, easy bruising/bleeding, severe headache, or vision changes.  The patient verbalized understanding of the proper use and possible adverse effects of doxycycline.  All of the patient's questions and concerns were addressed. Gabapentin Counseling: I discussed with the patient the risks of gabapentin including but not limited to dizziness, somnolence, fatigue and ataxia. Qbrexza Pregnancy And Lactation Text: There is no available data on Qbrexza use in pregnant women.  There is no available data on Qbrexza use in lactation. Metronidazole Counseling:  I discussed with the patient the risks of metronidazole including but not limited to seizures, nausea/vomiting, a metallic taste in the mouth, nausea/vomiting and severe allergy. Mirvaso Counseling: Mirvaso is a topical medication which can decrease superficial blood flow where applied. Side effects are uncommon and include stinging, redness and allergic reactions. Tazorac Counseling:  Patient advised that medication is irritating and drying.  Patient may need to apply sparingly and wash off after an hour before eventually leaving it on overnight.  The patient verbalized understanding of the proper use and possible adverse effects of tazorac.  All of the patient's questions and concerns were addressed. Sarecycline Pregnancy And Lactation Text: This medication is Pregnancy Category D and not consider safe during pregnancy. It is also excreted in breast milk. High Dose Vitamin A Pregnancy And Lactation Text: High dose vitamin A therapy is contraindicated during pregnancy and breast feeding. Birth Control Pills Pregnancy And Lactation Text: This medication should be avoided if pregnant and for the first 30 days post-partum. Cyclosporine Counseling:  I discussed with the patient the risks of cyclosporine including but not limited to hypertension, gingival hyperplasia,myelosuppression, immunosuppression, liver damage, kidney damage, neurotoxicity, lymphoma, and serious infections. The patient understands that monitoring is required including baseline blood pressure, CBC, CMP, lipid panel and uric acid, and then 1-2 times monthly CMP and blood pressure. Hydroquinone Counseling:  Patient advised that medication may result in skin irritation, lightening (hypopigmentation), dryness, and burning.  In the event of skin irritation, the patient was advised to reduce the amount of the drug applied or use it less frequently.  Rarely, spots that are treated with hydroquinone can become darker (pseudoochronosis).  Should this occur, patient instructed to stop medication and call the office. The patient verbalized understanding of the proper use and possible adverse effects of hydroquinone.  All of the patient's questions and concerns were addressed. Clofazimine Counseling:  I discussed with the patient the risks of clofazimine including but not limited to skin and eye pigmentation, liver damage, nausea/vomiting, gastrointestinal bleeding and allergy. Azithromycin Counseling:  I discussed with the patient the risks of azithromycin including but not limited to GI upset, allergic reaction, drug rash, diarrhea, and yeast infections. Hydroxyzine Counseling: Patient advised that the medication is sedating and not to drive a car after taking this medication.  Patient informed of potential adverse effects including but not limited to dry mouth, urinary retention, and blurry vision.  The patient verbalized understanding of the proper use and possible adverse effects of hydroxyzine.  All of the patient's questions and concerns were addressed. Oxybutynin Counseling:  I discussed with the patient the risks of oxybutynin including but not limited to skin rash, drowsiness, dry mouth, difficulty urinating, and blurred vision. Ketoconazole Counseling:   Patient counseled regarding improving absorption with orange juice.  Adverse effects include but are not limited to breast enlargement, headache, diarrhea, nausea, upset stomach, liver function test abnormalities, taste disturbance, and stomach pain.  There is a rare possibility of liver failure that can occur when taking ketoconazole. The patient understands that monitoring of LFTs may be required, especially at baseline. The patient verbalized understanding of the proper use and possible adverse effects of ketoconazole.  All of the patient's questions and concerns were addressed. Xelomaz Pregnancy And Lactation Text: This medication is Pregnancy Category D and is not considered safe during pregnancy.  The risk during breast feeding is also uncertain. Niacinamide Pregnancy And Lactation Text: These medications are considered safe during pregnancy. Rhofade Counseling: Rhofade is a topical medication which can decrease superficial blood flow where applied. Side effects are uncommon and include stinging, redness and allergic reactions. Topical Steroids Applications Pregnancy And Lactation Text: Most topical steroids are considered safe to use during pregnancy and lactation.  Any topical steroid applied to the breast or nipple should be washed off before breastfeeding. Zoryve Counseling:  I discussed with the patient that Zoryve is not for use in the eyes, mouth or vagina. The most commonly reported side effects include diarrhea, headache, insomnia, application site pain, upper respiratory tract infections, and urinary tract infections.  All of the patient's questions and concerns were addressed. Mirvaso Pregnancy And Lactation Text: This medication has not been assigned a Pregnancy Risk Category. It is unknown if the medication is excreted in breast milk. Tranexamic Acid Pregnancy And Lactation Text: It is unknown if this medication is safe during pregnancy or breast feeding. Libtayo Pregnancy And Lactation Text: This medication is contraindicated in pregnancy and when breast feeding. Cimzia Counseling:  I discussed with the patient the risks of Cimzia including but not limited to immunosuppression, allergic reactions and infections.  The patient understands that monitoring is required including a PPD at baseline and must alert us or the primary physician if symptoms of infection or other concerning signs are noted. Simponi Counseling:  I discussed with the patient the risks of golimumab including but not limited to myelosuppression, immunosuppression, autoimmune hepatitis, demyelinating diseases, lymphoma, and serious infections.  The patient understands that monitoring is required including a PPD at baseline and must alert us or the primary physician if symptoms of infection or other concerning signs are noted. Litfulo Pregnancy And Lactation Text: Based on animal studies, Lifulo may cause embryo-fetal harm when administered to pregnant women.  The medication should not be used in pregnancy.  Breastfeeding is not recommended during treatment. Nsaids Counseling: NSAID Counseling: I discussed with the patient that NSAIDs should be taken with food. Prolonged use of NSAIDs can result in the development of stomach ulcers.  Patient advised to stop taking NSAIDs if abdominal pain occurs.  The patient verbalized understanding of the proper use and possible adverse effects of NSAIDs.  All of the patient's questions and concerns were addressed. Odomzo Counseling- I discussed with the patient the risks of Odomzo including but not limited to nausea, vomiting, diarrhea, constipation, weight loss, changes in the sense of taste, decreased appetite, muscle spasms, and hair loss.  The patient verbalized understanding of the proper use and possible adverse effects of Odomzo.  All of the patient's questions and concerns were addressed. Metronidazole Pregnancy And Lactation Text: This medication is Pregnancy Category B and considered safe during pregnancy.  It is also excreted in breast milk. Topical Sulfur Applications Counseling: Topical Sulfur Counseling: Patient counseled that this medication may cause skin irritation or allergic reactions.  In the event of skin irritation, the patient was advised to reduce the amount of the drug applied or use it less frequently.   The patient verbalized understanding of the proper use and possible adverse effects of topical sulfur application.  All of the patient's questions and concerns were addressed. Valtrex Counseling: I discussed with the patient the risks of valacyclovir including but not limited to kidney damage, nausea, vomiting and severe allergy.  The patient understands that if the infection seems to be worsening or is not improving, they are to call. Hydroxyzine Pregnancy And Lactation Text: This medication is not safe during pregnancy and should not be taken. It is also excreted in breast milk and breast feeding isn't recommended. Cimzia Pregnancy And Lactation Text: This medication crosses the placenta but can be considered safe in certain situations. Cimzia may be excreted in breast milk. Doxycycline Pregnancy And Lactation Text: This medication is Pregnancy Category D and not consider safe during pregnancy. It is also excreted in breast milk but is considered safe for shorter treatment courses. Ilumya Counseling: I discussed with the patient the risks of tildrakizumab including but not limited to immunosuppression, malignancy, posterior leukoencephalopathy syndrome, and serious infections.  The patient understands that monitoring is required including a PPD at baseline and must alert us or the primary physician if symptoms of infection or other concerning signs are noted. Calcipotriene Counseling:  I discussed with the patient the risks of calcipotriene including but not limited to erythema, scaling, itching, and irritation. Tetracycline Counseling: Patient counseled regarding possible photosensitivity and increased risk for sunburn.  Patient instructed to avoid sunlight, if possible.  When exposed to sunlight, patients should wear protective clothing, sunglasses, and sunscreen.  The patient was instructed to call the office immediately if the following severe adverse effects occur:  hearing changes, easy bruising/bleeding, severe headache, or vision changes.  The patient verbalized understanding of the proper use and possible adverse effects of tetracycline.  All of the patient's questions and concerns were addressed. Patient understands to avoid pregnancy while on therapy due to potential birth defects. Tazorac Pregnancy And Lactation Text: This medication is not safe during pregnancy. It is unknown if this medication is excreted in breast milk. Spironolactone Counseling: Patient advised regarding risks of diarrhea, abdominal pain, hyperkalemia, birth defects (for female patients), liver toxicity and renal toxicity. The patient may need blood work to monitor liver and kidney function and potassium levels while on therapy. The patient verbalized understanding of the proper use and possible adverse effects of spironolactone.  All of the patient's questions and concerns were addressed. 5-Fu Counseling: 5-Fluorouracil Counseling:  I discussed with the patient the risks of 5-fluorouracil including but not limited to erythema, scaling, itching, weeping, crusting, and pain. Calcipotriene Pregnancy And Lactation Text: The use of this medication during pregnancy or lactation is not recommended as there is insufficient data. Opzelura Counseling:  I discussed with the patient the risks of Opzelura including but not limited to nasopharngitis, bronchitis, ear infection, eosinophila, hives, diarrhea, folliculitis, tonsillitis, and rhinorrhea.  Taken orally, this medication has been linked to serious infections; higher rate of mortality; malignancy and lymphoproliferative disorders; major adverse cardiovascular events; thrombosis; thrombocytopenia, anemia, and neutropenia; and lipid elevations. Acitretin Counseling:  I discussed with the patient the risks of acitretin including but not limited to hair loss, dry lips/skin/eyes, liver damage, hyperlipidemia, depression/suicidal ideation, photosensitivity.  Serious rare side effects can include but are not limited to pancreatitis, pseudotumor cerebri, bony changes, clot formation/stroke/heart attack.  Patient understands that alcohol is contraindicated since it can result in liver toxicity and significantly prolong the elimination of the drug by many years. Azithromycin Pregnancy And Lactation Text: This medication is considered safe during pregnancy and is also secreted in breast milk. Aklief counseling:  Patient advised to apply a pea-sized amount only at bedtime and wait 30 minutes after washing their face before applying.  If too drying, patient may add a non-comedogenic moisturizer.  The most commonly reported side effects including irritation, redness, scaling, dryness, stinging, burning, itching, and increased risk of sunburn.  The patient verbalized understanding of the proper use and possible adverse effects of retinoids.  All of the patient's questions and concerns were addressed. Tremfya Counseling: I discussed with the patient the risks of guselkumab including but not limited to immunosuppression, serious infections, and drug reactions.  The patient understands that monitoring is required including a PPD at baseline and must alert us or the primary physician if symptoms of infection or other concerning signs are noted. Topical Clindamycin Counseling: Patient counseled that this medication may cause skin irritation or allergic reactions.  In the event of skin irritation, the patient was advised to reduce the amount of the drug applied or use it less frequently.   The patient verbalized understanding of the proper use and possible adverse effects of clindamycin.  All of the patient's questions and concerns were addressed. Minocycline Counseling: Patient advised regarding possible photosensitivity and discoloration of the teeth, skin, lips, tongue and gums.  Patient instructed to avoid sunlight, if possible.  When exposed to sunlight, patients should wear protective clothing, sunglasses, and sunscreen.  The patient was instructed to call the office immediately if the following severe adverse effects occur:  hearing changes, easy bruising/bleeding, severe headache, or vision changes.  The patient verbalized understanding of the proper use and possible adverse effects of minocycline.  All of the patient's questions and concerns were addressed. Ketoconazole Pregnancy And Lactation Text: This medication is Pregnancy Category C and it isn't know if it is safe during pregnancy. It is also excreted in breast milk and breast feeding isn't recommended. Propranolol Counseling:  I discussed with the patient the risks of propranolol including but not limited to low heart rate, low blood pressure, low blood sugar, restlessness and increased cold sensitivity. They should call the office if they experience any of these side effects. Topical Sulfur Applications Pregnancy And Lactation Text: This medication is Pregnancy Category C and has an unknown safety profile during pregnancy. It is unknown if this topical medication is excreted in breast milk. Zyclara Counseling:  I discussed with the patient the risks of imiquimod including but not limited to erythema, scaling, itching, weeping, crusting, and pain.  Patient understands that the inflammatory response to imiquimod is variable from person to person and was educated regarded proper titration schedule.  If flu-like symptoms develop, patient knows to discontinue the medication and contact us. Cosentyx Counseling:  I discussed with the patient the risks of Cosentyx including but not limited to worsening of Crohn's disease, immunosuppression, allergic reactions and infections.  The patient understands that monitoring is required including a PPD at baseline and must alert us or the primary physician if symptoms of infection or other concerning signs are noted. Valtrex Pregnancy And Lactation Text: this medication is Pregnancy Category B and is considered safe during pregnancy. This medication is not directly found in breast milk but it's metabolite acyclovir is present. Spironolactone Pregnancy And Lactation Text: This medication can cause feminization of the male fetus and should be avoided during pregnancy. The active metabolite is also found in breast milk. Nsaids Pregnancy And Lactation Text: These medications are considered safe up to 30 weeks gestation. It is excreted in breast milk. Cantharidin Counseling:  I discussed with the patient the risks of Cantharidin including but not limited to pain, redness, burning, itching, and blistering. Glycopyrrolate Counseling:  I discussed with the patient the risks of glycopyrrolate including but not limited to skin rash, drowsiness, dry mouth, difficulty urinating, and blurred vision. Olumiant Counseling: I discussed with the patient the risks of Olumiant therapy including but not limited to upper respiratory tract infections, shingles, cold sores, and nausea. Live vaccines should be avoided.  This medication has been linked to serious infections; higher rate of mortality; malignancy and lymphoproliferative disorders; major adverse cardiovascular events; thrombosis; gastrointestinal perforations; neutropenia; lymphopenia; anemia; liver enzyme elevations; and lipid elevations. Bactrim Counseling:  I discussed with the patient the risks of sulfa antibiotics including but not limited to GI upset, allergic reaction, drug rash, diarrhea, dizziness, photosensitivity, and yeast infections.  Rarely, more serious reactions can occur including but not limited to aplastic anemia, agranulocytosis, methemoglobinemia, blood dyscrasias, liver or kidney failure, lung infiltrates or desquamative/blistering drug rashes. Opzelura Pregnancy And Lactation Text: There is insufficient data to evaluate drug-associated risk for major birth defects, miscarriage, or other adverse maternal or fetal outcomes.  There is a pregnancy registry that monitors pregnancy outcomes in pregnant persons exposed to the medication during pregnancy.  It is unknown if this medication is excreted in breast milk.  Do not breastfeed during treatment and for about 4 weeks after the last dose. Aklief Pregnancy And Lactation Text: It is unknown if this medication is safe to use during pregnancy.  It is unknown if this medication is excreted in breast milk.  Breastfeeding women should use the topical cream on the smallest area of the skin for the shortest time needed while breastfeeding.  Do not apply to nipple and areola. Cantharidin Pregnancy And Lactation Text: This medication has not been proven safe during pregnancy. It is unknown if this medication is excreted in breast milk. Acitretin Pregnancy And Lactation Text: This medication is Pregnancy Category X and should not be given to women who are pregnant or may become pregnant in the future. This medication is excreted in breast milk. Solaraze Counseling:  I discussed with the patient the risks of Solaraze including but not limited to erythema, scaling, itching, weeping, crusting, and pain. Albendazole Counseling:  I discussed with the patient the risks of albendazole including but not limited to cytopenia, kidney damage, nausea/vomiting and severe allergy.  The patient understands that this medication is being used in an off-label manner. Olumiant Pregnancy And Lactation Text: Based on animal studies, Olumiant may cause embryo-fetal harm when administered to pregnant women.  The medication should not be used in pregnancy.  Breastfeeding is not recommended during treatment. Glycopyrrolate Pregnancy And Lactation Text: This medication is Pregnancy Category B and is considered safe during pregnancy. It is unknown if it is excreted breast milk. Imiquimod Counseling:  I discussed with the patient the risks of imiquimod including but not limited to erythema, scaling, itching, weeping, crusting, and pain.  Patient understands that the inflammatory response to imiquimod is variable from person to person and was educated regarded proper titration schedule.  If flu-like symptoms develop, patient knows to discontinue the medication and contact us. Colchicine Counseling:  Patient counseled regarding adverse effects including but not limited to stomach upset (nausea, vomiting, stomach pain, or diarrhea).  Patient instructed to limit alcohol consumption while taking this medication.  Colchicine may reduce blood counts especially with prolonged use.  The patient understands that monitoring of kidney function and blood counts may be required, especially at baseline. The patient verbalized understanding of the proper use and possible adverse effects of colchicine.  All of the patient's questions and concerns were addressed. Skyrizi Counseling: I discussed with the patient the risks of risankizumab-rzaa including but not limited to immunosuppression, and serious infections.  The patient understands that monitoring is required including a PPD at baseline and must alert us or the primary physician if symptoms of infection or other concerning signs are noted. Methotrexate Counseling:  Patient counseled regarding adverse effects of methotrexate including but not limited to nausea, vomiting, abnormalities in liver function tests. Patients may develop mouth sores, rash, diarrhea, and abnormalities in blood counts. The patient understands that monitoring is required including LFT's and blood counts.  There is a rare possibility of scarring of the liver and lung problems that can occur when taking methotrexate. Persistent nausea, loss of appetite, pale stools, dark urine, cough, and shortness of breath should be reported immediately. Patient advised to discontinue methotrexate treatment at least three months before attempting to become pregnant.  I discussed the need for folate supplements while taking methotrexate.  These supplements can decrease side effects during methotrexate treatment. The patient verbalized understanding of the proper use and possible adverse effects of methotrexate.  All of the patient's questions and concerns were addressed. Terbinafine Counseling: Patient counseling regarding adverse effects of terbinafine including but not limited to headache, diarrhea, rash, upset stomach, liver function test abnormalities, itching, taste/smell disturbance, nausea, abdominal pain, and flatulence.  There is a rare possibility of liver failure that can occur when taking terbinafine.  The patient understands that a baseline LFT and kidney function test may be required. The patient verbalized understanding of the proper use and possible adverse effects of terbinafine.  All of the patient's questions and concerns were addressed. Wartpeel Counseling:  I discussed with the patient the risks of Wartpeel including but not limited to erythema, scaling, itching, weeping, crusting, and pain. Infliximab Counseling:  I discussed with the patient the risks of infliximab including but not limited to myelosuppression, immunosuppression, autoimmune hepatitis, demyelinating diseases, lymphoma, and serious infections.  The patient understands that monitoring is required including a PPD at baseline and must alert us or the primary physician if symptoms of infection or other concerning signs are noted. Methotrexate Pregnancy And Lactation Text: This medication is Pregnancy Category X and is known to cause fetal harm. This medication is excreted in breast milk. Drysol Counseling:  I discussed with the patient the risks of drysol/aluminum chloride including but not limited to skin rash, itching, irritation, burning. Azathioprine Counseling:  I discussed with the patient the risks of azathioprine including but not limited to myelosuppression, immunosuppression, hepatotoxicity, lymphoma, and infections.  The patient understands that monitoring is required including baseline LFTs, Creatinine, possible TPMP genotyping and weekly CBCs for the first month and then every 2 weeks thereafter.  The patient verbalized understanding of the proper use and possible adverse effects of azathioprine.  All of the patient's questions and concerns were addressed. Propranolol Pregnancy And Lactation Text: This medication is Pregnancy Category C and it isn't known if it is safe during pregnancy. It is excreted in breast milk. Olanzapine Counseling- I discussed with the patient the common side effects of olanzapine including but are not limited to: lack of energy, dry mouth, increased appetite, sleepiness, tremor, constipation, dizziness, changes in behavior, or restlessness.  Explained that teenagers are more likely to experience headaches, abdominal pain, pain in the arms or legs, tiredness, and sleepiness.  Serious side effects include but are not limited: increased risk of death in elderly patients who are confused, have memory loss, or dementia-related psychosis; hyperglycemia; increased cholesterol and triglycerides; and weight gain. Bactrim Pregnancy And Lactation Text: This medication is Pregnancy Category D and is known to cause fetal risk.  It is also excreted in breast milk. Azelaic Acid Counseling: Patient counseled that medicine may cause skin irritation and to avoid applying near the eyes.  In the event of skin irritation, the patient was advised to reduce the amount of the drug applied or use it less frequently.   The patient verbalized understanding of the proper use and possible adverse effects of azelaic acid.  All of the patient's questions and concerns were addressed. Solaraze Pregnancy And Lactation Text: This medication is Pregnancy Category B and is considered safe. There is some data to suggest avoiding during the third trimester. It is unknown if this medication is excreted in breast milk. Topical Ketoconazole Counseling: Patient counseled that this medication may cause skin irritation or allergic reactions.  In the event of skin irritation, the patient was advised to reduce the amount of the drug applied or use it less frequently.   The patient verbalized understanding of the proper use and possible adverse effects of ketoconazole.  All of the patient's questions and concerns were addressed. Fluconazole Counseling:  Patient counseled regarding adverse effects of fluconazole including but not limited to headache, diarrhea, nausea, upset stomach, liver function test abnormalities, taste disturbance, and stomach pain.  There is a rare possibility of liver failure that can occur when taking fluconazole.  The patient understands that monitoring of LFTs and kidney function test may be required, especially at baseline. The patient verbalized understanding of the proper use and possible adverse effects of fluconazole.  All of the patient's questions and concerns were addressed. Picato Counseling:  I discussed with the patient the risks of Picato including but not limited to erythema, scaling, itching, weeping, crusting, and pain. Rinvoq Counseling: I discussed with the patient the risks of Rinvoq therapy including but not limited to upper respiratory tract infections, shingles, cold sores, bronchitis, nausea, cough, fever, acne, and headache. Live vaccines should be avoided.  This medication has been linked to serious infections; higher rate of mortality; malignancy and lymphoproliferative disorders; major adverse cardiovascular events; thrombosis; thrombocytopenia, anemia, and neutropenia; lipid elevations; liver enzyme elevations; and gastrointestinal perforations. Hydroxychloroquine Counseling:  I discussed with the patient that a baseline ophthalmologic exam is needed at the start of therapy and every year thereafter while on therapy. A CBC may also be warranted for monitoring.  The side effects of this medication were discussed with the patient, including but not limited to agranulocytosis, aplastic anemia, seizures, rashes, retinopathy, and liver toxicity. Patient instructed to call the office should any adverse effect occur.  The patient verbalized understanding of the proper use and possible adverse effects of Plaquenil.  All the patient's questions and concerns were addressed. Bexarotene Counseling:  I discussed with the patient the risks of bexarotene including but not limited to hair loss, dry lips/skin/eyes, liver abnormalities, hyperlipidemia, pancreatitis, depression/suicidal ideation, photosensitivity, drug rash/allergic reactions, hypothyroidism, anemia, leukopenia, infection, cataracts, and teratogenicity.  Patient understands that they will need regular blood tests to check lipid profile, liver function tests, white blood cell count, thyroid function tests and pregnancy test if applicable. Xolair Counseling:  Patient informed of potential adverse effects including but not limited to fever, muscle aches, rash and allergic reactions.  The patient verbalized understanding of the proper use and possible adverse effects of Xolair.  All of the patient's questions and concerns were addressed. Dutasteride Male Counseling: Dustasteride Counseling:  I discussed with the patient the risks of use of dutasteride including but not limited to decreased libido, decreased ejaculate volume, and gynecomastia. Women who can become pregnant should not handle medication.  All of the patient's questions and concerns were addressed. Quinolones Counseling:  I discussed with the patient the risks of fluoroquinolones including but not limited to GI upset, allergic reaction, drug rash, diarrhea, dizziness, photosensitivity, yeast infections, liver function test abnormalities, tendonitis/tendon rupture.

## 2024-09-19 NOTE — PROCEDURAL SAFETY CHECKLIST WITH OR WITHOUT SEDATION - NSTEAMNURSEFT_GEN_ALL_CORE
Patient was unable to lift bilateral lower extremities during motor assessment. Patient states that she was never able to since admission, even with prior documentation stating otherwise.
Diab
Gabe/Sal

## 2025-04-09 NOTE — OB PROVIDER TRIAGE NOTE - CURRENT PREGNANCY COMPLICATIONS, OB PROFILE
[facility] [facility]/pharmacy #[id number] - [place], [place] - [address] - P [phone] - F [phone]  [address] [facility] [address]      Hours: 24-hours Phone: [phone]   Primary Clinician of Record.   RONDA Llamas (electronically signed)    (Please note that parts of this dictation were completed with voice recognition software. Quite often unanticipated grammatical, syntax, homophones, and other interpretive errors are inadvertently transcribed by the computer software. Please disregards these errors. Please excuse any errors that have escaped final proofreading.)       Vini Juan PA  04/09/25 2706     Incompetent Cervix/Cervical Insufficiency/Hypertensive Disorder

## 2025-04-15 ENCOUNTER — INPATIENT (INPATIENT)
Facility: HOSPITAL | Age: 30
LOS: 1 days | Discharge: HOME | DRG: 540 | End: 2025-04-17
Attending: OBSTETRICS & GYNECOLOGY | Admitting: OBSTETRICS & GYNECOLOGY
Payer: MEDICAID

## 2025-04-15 VITALS
SYSTOLIC BLOOD PRESSURE: 132 MMHG | HEART RATE: 106 BPM | DIASTOLIC BLOOD PRESSURE: 60 MMHG | RESPIRATION RATE: 14 BRPM | TEMPERATURE: 98 F

## 2025-04-15 DIAGNOSIS — Z98.890 OTHER SPECIFIED POSTPROCEDURAL STATES: Chronic | ICD-10-CM

## 2025-04-15 DIAGNOSIS — Z98.891 HISTORY OF UTERINE SCAR FROM PREVIOUS SURGERY: Chronic | ICD-10-CM

## 2025-04-15 DIAGNOSIS — O26.899 OTHER SPECIFIED PREGNANCY RELATED CONDITIONS, UNSPECIFIED TRIMESTER: ICD-10-CM

## 2025-04-15 LAB
AMPHET UR-MCNC: NEGATIVE — SIGNIFICANT CHANGE UP
APPEARANCE UR: ABNORMAL
BACTERIA # UR AUTO: ABNORMAL /HPF
BARBITURATES UR SCN-MCNC: NEGATIVE — SIGNIFICANT CHANGE UP
BASOPHILS # BLD AUTO: 0.01 K/UL — SIGNIFICANT CHANGE UP (ref 0–0.2)
BASOPHILS NFR BLD AUTO: 0.1 % — SIGNIFICANT CHANGE UP (ref 0–1)
BENZODIAZ UR-MCNC: NEGATIVE — SIGNIFICANT CHANGE UP
BILIRUB UR-MCNC: NEGATIVE — SIGNIFICANT CHANGE UP
BLD GP AB SCN SERPL QL: SIGNIFICANT CHANGE UP
BUPRENORPHINE SCREEN, URINE RESULT: NEGATIVE — SIGNIFICANT CHANGE UP
CAST: 1 /LPF — SIGNIFICANT CHANGE UP (ref 0–4)
COCAINE METAB.OTHER UR-MCNC: NEGATIVE — SIGNIFICANT CHANGE UP
COLOR SPEC: YELLOW — SIGNIFICANT CHANGE UP
DIFF PNL FLD: ABNORMAL
EOSINOPHIL # BLD AUTO: 0.4 K/UL — SIGNIFICANT CHANGE UP (ref 0–0.7)
EOSINOPHIL NFR BLD AUTO: 4.2 % — SIGNIFICANT CHANGE UP (ref 0–8)
FENTANYL UR QL: NEGATIVE — SIGNIFICANT CHANGE UP
GLUCOSE UR QL: NEGATIVE MG/DL — SIGNIFICANT CHANGE UP
HCT VFR BLD CALC: 33 % — LOW (ref 37–47)
HGB BLD-MCNC: 10.4 G/DL — LOW (ref 12–16)
IMM GRANULOCYTES NFR BLD AUTO: 0.4 % — HIGH (ref 0.1–0.3)
KETONES UR-MCNC: 40 MG/DL
L&D DRUG SCREEN, URINE: SIGNIFICANT CHANGE UP
LEUKOCYTE ESTERASE UR-ACNC: NEGATIVE — SIGNIFICANT CHANGE UP
LYMPHOCYTES # BLD AUTO: 2.05 K/UL — SIGNIFICANT CHANGE UP (ref 1.2–3.4)
LYMPHOCYTES # BLD AUTO: 21.6 % — SIGNIFICANT CHANGE UP (ref 20.5–51.1)
MCHC RBC-ENTMCNC: 25.6 PG — LOW (ref 27–31)
MCHC RBC-ENTMCNC: 31.5 G/DL — LOW (ref 32–37)
MCV RBC AUTO: 81.3 FL — SIGNIFICANT CHANGE UP (ref 81–99)
METHADONE UR-MCNC: NEGATIVE — SIGNIFICANT CHANGE UP
MONOCYTES # BLD AUTO: 0.55 K/UL — SIGNIFICANT CHANGE UP (ref 0.1–0.6)
MONOCYTES NFR BLD AUTO: 5.8 % — SIGNIFICANT CHANGE UP (ref 1.7–9.3)
MUCOUS THREADS # UR AUTO: PRESENT
NEUTROPHILS # BLD AUTO: 6.43 K/UL — SIGNIFICANT CHANGE UP (ref 1.4–6.5)
NEUTROPHILS NFR BLD AUTO: 67.9 % — SIGNIFICANT CHANGE UP (ref 42.2–75.2)
NITRITE UR-MCNC: NEGATIVE — SIGNIFICANT CHANGE UP
NRBC BLD AUTO-RTO: 0 /100 WBCS — SIGNIFICANT CHANGE UP (ref 0–0)
OPIATES UR-MCNC: NEGATIVE — SIGNIFICANT CHANGE UP
OXYCODONE UR-MCNC: NEGATIVE — SIGNIFICANT CHANGE UP
PCP UR-MCNC: NEGATIVE — SIGNIFICANT CHANGE UP
PH UR: 7 — SIGNIFICANT CHANGE UP (ref 5–8)
PLATELET # BLD AUTO: 232 K/UL — SIGNIFICANT CHANGE UP (ref 130–400)
PMV BLD: 11.4 FL — HIGH (ref 7.4–10.4)
PRENATAL SYPHILIS TEST: SIGNIFICANT CHANGE UP
PROPOXYPHENE QUALITATIVE URINE RESULT: NEGATIVE — SIGNIFICANT CHANGE UP
PROT UR-MCNC: 100 MG/DL
RBC # BLD: 4.06 M/UL — LOW (ref 4.2–5.4)
RBC # FLD: 16.1 % — HIGH (ref 11.5–14.5)
RBC CASTS # UR COMP ASSIST: 13 /HPF — HIGH (ref 0–4)
SP GR SPEC: 1.02 — SIGNIFICANT CHANGE UP (ref 1–1.03)
SQUAMOUS # UR AUTO: 34 /HPF — HIGH (ref 0–5)
UROBILINOGEN FLD QL: 0.2 MG/DL — SIGNIFICANT CHANGE UP (ref 0.2–1)
WBC # BLD: 9.48 K/UL — SIGNIFICANT CHANGE UP (ref 4.8–10.8)
WBC # FLD AUTO: 9.48 K/UL — SIGNIFICANT CHANGE UP (ref 4.8–10.8)
WBC UR QL: 6 /HPF — HIGH (ref 0–5)

## 2025-04-15 PROCEDURE — 59025 FETAL NON-STRESS TEST: CPT

## 2025-04-15 PROCEDURE — 85025 COMPLETE CBC W/AUTO DIFF WBC: CPT

## 2025-04-15 PROCEDURE — 88307 TISSUE EXAM BY PATHOLOGIST: CPT | Mod: 26

## 2025-04-15 PROCEDURE — 88307 TISSUE EXAM BY PATHOLOGIST: CPT

## 2025-04-15 PROCEDURE — 36415 COLL VENOUS BLD VENIPUNCTURE: CPT

## 2025-04-15 RX ORDER — SODIUM CHLORIDE 9 G/1000ML
1000 INJECTION, SOLUTION INTRAVENOUS
Refills: 0 | Status: DISCONTINUED | OUTPATIENT
Start: 2025-04-15 | End: 2025-04-15

## 2025-04-15 RX ORDER — DIPHENHYDRAMINE HCL 12.5MG/5ML
25 ELIXIR ORAL EVERY 6 HOURS
Refills: 0 | Status: DISCONTINUED | OUTPATIENT
Start: 2025-04-15 | End: 2025-04-17

## 2025-04-15 RX ORDER — CEFAZOLIN SODIUM IN 0.9 % NACL 3 G/100 ML
2000 INTRAVENOUS SOLUTION, PIGGYBACK (ML) INTRAVENOUS ONCE
Refills: 0 | Status: DISCONTINUED | OUTPATIENT
Start: 2025-04-15 | End: 2025-04-15

## 2025-04-15 RX ORDER — SIMETHICONE 80 MG
80 TABLET,CHEWABLE ORAL EVERY 4 HOURS
Refills: 0 | Status: DISCONTINUED | OUTPATIENT
Start: 2025-04-15 | End: 2025-04-17

## 2025-04-15 RX ORDER — OXYCODONE HYDROCHLORIDE 30 MG/1
5 TABLET ORAL
Refills: 0 | Status: COMPLETED | OUTPATIENT
Start: 2025-04-15 | End: 2025-04-22

## 2025-04-15 RX ORDER — INFLUENZA A VIRUS A/IDAHO/07/2018 (H1N1) ANTIGEN (MDCK CELL DERIVED, PROPIOLACTONE INACTIVATED, INFLUENZA A VIRUS A/INDIANA/08/2018 (H3N2) ANTIGEN (MDCK CELL DERIVED, PROPIOLACTONE INACTIVATED), INFLUENZA B VIRUS B/SINGAPORE/INFTT-16-0610/2016 ANTIGEN (MDCK CELL DERIVED, PROPIOLACTONE INACTIVATED), INFLUENZA B VIRUS B/IOWA/06/2017 ANTIGEN (MDCK CELL DERIVED, PROPIOLACTONE INACTIVATED) 15; 15; 15; 15 UG/.5ML; UG/.5ML; UG/.5ML; UG/.5ML
0.5 INJECTION, SUSPENSION INTRAMUSCULAR ONCE
Refills: 0 | Status: DISCONTINUED | OUTPATIENT
Start: 2025-04-15 | End: 2025-04-15

## 2025-04-15 RX ORDER — OXYTOCIN-SODIUM CHLORIDE 0.9% IV SOLN 30 UNIT/500ML 30-0.9/5 UT/ML-%
167 SOLUTION INTRAVENOUS
Qty: 30 | Refills: 0 | Status: COMPLETED | OUTPATIENT
Start: 2025-04-15 | End: 2025-04-15

## 2025-04-15 RX ORDER — ACETAMINOPHEN 500 MG/5ML
975 LIQUID (ML) ORAL
Refills: 0 | Status: DISCONTINUED | OUTPATIENT
Start: 2025-04-15 | End: 2025-04-17

## 2025-04-15 RX ORDER — IBUPROFEN 200 MG
600 TABLET ORAL EVERY 6 HOURS
Refills: 0 | Status: COMPLETED | OUTPATIENT
Start: 2025-04-15 | End: 2026-03-14

## 2025-04-15 RX ORDER — OXYCODONE HYDROCHLORIDE 30 MG/1
5 TABLET ORAL ONCE
Refills: 0 | Status: DISCONTINUED | OUTPATIENT
Start: 2025-04-15 | End: 2025-04-17

## 2025-04-15 RX ORDER — DIPHENHYDRAMINE HCL 12.5MG/5ML
25 ELIXIR ORAL ONCE
Refills: 0 | Status: COMPLETED | OUTPATIENT
Start: 2025-04-15 | End: 2025-04-15

## 2025-04-15 RX ORDER — OXYTOCIN-SODIUM CHLORIDE 0.9% IV SOLN 30 UNIT/500ML 30-0.9/5 UT/ML-%
SOLUTION INTRAVENOUS
Qty: 30 | Refills: 0 | Status: DISCONTINUED | OUTPATIENT
Start: 2025-04-15 | End: 2025-04-17

## 2025-04-15 RX ORDER — KETOROLAC TROMETHAMINE 30 MG/ML
30 INJECTION, SOLUTION INTRAMUSCULAR; INTRAVENOUS EVERY 6 HOURS
Refills: 0 | Status: DISCONTINUED | OUTPATIENT
Start: 2025-04-15 | End: 2025-04-16

## 2025-04-15 RX ORDER — MAGNESIUM HYDROXIDE 400 MG/5ML
30 SUSPENSION ORAL
Refills: 0 | Status: DISCONTINUED | OUTPATIENT
Start: 2025-04-15 | End: 2025-04-17

## 2025-04-15 RX ORDER — CEFAZOLIN SODIUM IN 0.9 % NACL 3 G/100 ML
2000 INTRAVENOUS SOLUTION, PIGGYBACK (ML) INTRAVENOUS ONCE
Refills: 0 | Status: COMPLETED | OUTPATIENT
Start: 2025-04-15 | End: 2025-04-15

## 2025-04-15 RX ORDER — SODIUM CHLORIDE 9 G/1000ML
1000 INJECTION, SOLUTION INTRAVENOUS
Refills: 0 | Status: DISCONTINUED | OUTPATIENT
Start: 2025-04-15 | End: 2025-04-17

## 2025-04-15 RX ORDER — CLOSTRIDIUM TETANI TOXOID ANTIGEN (FORMALDEHYDE INACTIVATED), CORYNEBACTERIUM DIPHTHERIAE TOXOID ANTIGEN (FORMALDEHYDE INACTIVATED), BORDETELLA PERTUSSIS TOXOID ANTIGEN (GLUTARALDEHYDE INACTIVATED), BORDETELLA PERTUSSIS FILAMENTOUS HEMAGGLUTININ ANTIGEN (FORMALDEHYDE INACTIVATED), BORDETELLA PERTUSSIS PERTACTIN ANTIGEN, AND BORDETELLA PERTUSSIS FIMBRIAE 2/3 ANTIGEN 5; 2; 2.5; 5; 3; 5 [LF]/.5ML; [LF]/.5ML; UG/.5ML; UG/.5ML; UG/.5ML; UG/.5ML
0.5 INJECTION, SUSPENSION INTRAMUSCULAR ONCE
Refills: 0 | Status: DISCONTINUED | OUTPATIENT
Start: 2025-04-15 | End: 2025-04-17

## 2025-04-15 RX ORDER — AZITHROMYCIN 250 MG
500 CAPSULE ORAL ONCE
Refills: 0 | Status: COMPLETED | OUTPATIENT
Start: 2025-04-15 | End: 2025-04-15

## 2025-04-15 RX ORDER — PRENATAL 136/IRON/FOLIC ACID 27 MG-1 MG
1 TABLET ORAL DAILY
Refills: 0 | Status: DISCONTINUED | OUTPATIENT
Start: 2025-04-15 | End: 2025-04-17

## 2025-04-15 RX ORDER — MODIFIED LANOLIN 100 %
1 CREAM (GRAM) TOPICAL EVERY 6 HOURS
Refills: 0 | Status: DISCONTINUED | OUTPATIENT
Start: 2025-04-15 | End: 2025-04-17

## 2025-04-15 RX ADMIN — Medication 250 MILLIGRAM(S): at 19:56

## 2025-04-15 RX ADMIN — OXYTOCIN-SODIUM CHLORIDE 0.9% IV SOLN 30 UNIT/500ML 167 MILLIUNIT(S)/MIN: 30-0.9/5 SOLUTION at 20:41

## 2025-04-15 RX ADMIN — KETOROLAC TROMETHAMINE 30 MILLIGRAM(S): 30 INJECTION, SOLUTION INTRAMUSCULAR; INTRAVENOUS at 21:20

## 2025-04-15 RX ADMIN — Medication 25 MILLIGRAM(S): at 23:07

## 2025-04-15 RX ADMIN — Medication 100 MILLIGRAM(S): at 19:42

## 2025-04-15 NOTE — CHART NOTE - NSCHARTNOTEFT_GEN_A_CORE
PACU ANESTHESIA ADMISSION NOTE      Procedure:   Post op diagnosis:      ____  Intubated  TV:______       Rate: ______      FiO2: ______    __x__  Patent Airway    _x___  Full return of protective reflexes    _x___  Full recovery from anesthesia / back to baseline status    Vitals:  temp(F) 98  /76  spo2 98  RR 17  pulse 70    Mental Status:  x____ Awake   _____ Alert   _____ Drowsy   _____ Sedated    Nausea/Vomiting:  __x__ NO  ______Yes,   See Post - Op Orders          Pain Scale (0-10):  _____    Treatment: ____ None    __x__ See Post - Op/PCA Orders    Post - Operative Fluids:   ____ Oral   __x__ See Post - Op Orders    Plan: Discharge:   ____Home       x _____Floor     _____Critical Care    _____  Other:_________________    Comments: uneventful anesthesia course no complications. Vitals stable. Pt transferred to PACU

## 2025-04-15 NOTE — BRIEF OPERATIVE NOTE - NSICDXBRIEFPREOP_GEN_ALL_CORE_FT
PRE-OP DIAGNOSIS:  34 weeks gestation of pregnancy 15-Apr-2025 21:37:34  Hernan Trujillo  H/O  section 15-Apr-2025 21:38:04  Hernan Trujillo   premature rupture of membranes 15-Apr-2025 21:38:28  Hernan Trujillo

## 2025-04-15 NOTE — OB PROVIDER DELIVERY SUMMARY - NSSELHIDDEN_OBGYN_ALL_OB_FT
[NS_DeliveryAttending1_OBGYN_ALL_OB_FT:[id number]=],[NS_DeliveryAssist1_OBGYN_ALL_OB_FT:[email or web address]=],[NS_DeliveryRN_OBGYN_ALL_OB_FT:[id number]=]

## 2025-04-15 NOTE — OB RN INTRAOPERATIVE NOTE - NS_ELECTROCAUTCUT_OBGYN_ALL_OB_FT
Patient: Brenton Gabriel               Sex: male            MRN: [id number]      YOB: 1953      Age:  68 year old              Date of Surgery: 11/5/2021   Preoperative Diagnosis: Nuclear sclerosis cataract right eye  Postoperative Diagnosis: Same  Surgeon(s) and Role:     * Adama Mendez MD - Primary  Anesthesia: Local mac  Procedure: Phacoemulsification cataract extraction with posterior chamber lens implant right eye  Procedure in Detail:  Patient was brought to the OR after informed consent obtained and then given local anesthesia to the right eye with a 50/50 mixture of 2% lidocaine and 0.5% Marcaine via retrobulbar block and Nadbath lid block.  Patient was prepped and draped in usual manner with lid speculum placed.  Superior conjunctival peritomy performed with wet field coagulation used to achieve hemostasis.  Spicer blade was used to make a grooved incision superiorly with 2.4 mm phaco keratome used to enter the anterior chamber.  Anterior capsulorrhexis capsulotomy was performed under Viscoat control with 25 gauge needle cystatome. The anterior capsular flap was removed and hydrodissection performed.  Phacoemulsification of the dense lens nucleus was performed until satisfactory.  Cortical cleanup was performed with the irrigation/aspiration unit until satisfactory.  Provisc was injected into the capsular bag to separate the capsular flaps.  Posterior chamber lens by Abbott/Tecnis/JnJ model DCB00, 18.5 diopters was found to be satisfactory and inserted into the capsular bag such that the lens was well centered.  Provisc was removed from the anterior chamber with the irrigation aspiration unit and Miostat injected which brought the pupil down nicely.  The wound was closed with interrupted 10-0 nylon sutures with the wound checked for leaks with no evidence of leaks.  Light shield was used as appropriate.  The conjunctival flap was attached back into position with one buried 10-0  nylon suture.  Subconjunctival injections given consisting of Ancef 125 mg, Gentamicin 20 mg, and Solumedrol 0.5 cc. Topical betadine was placed on the eye. Tobradex eye ointment was placed on the eye with pressure patch and eye shield.  The patient was taken to the recovery room in stable condition with no complications.  Estimated Blood Loss:  Minimal     Tourniquet Time: None   Implants:   Implant Name Type Inv. Item Serial No.  Lot No. LRB No. Used Action   IOL TECNIS MONO DCB00 18.5 - [id number] Lens IOL TECNIS MONO DCB00 18.5 [id number] [employer]IL[employer]  Right 1 Implanted      Specimens: None  Drains: None  Complications: None  Counts: Sponge and needle counts were correct times two.   40

## 2025-04-15 NOTE — OB PROVIDER H&P - NSICDXPASTSURGICALHX_GEN_ALL_CORE_FT
PAST SURGICAL HISTORY:  History of  section     History of dilatation and curettage     History of hysteroscopy polypectomy

## 2025-04-15 NOTE — OB RN INTRAOPERATIVE NOTE - NS_IMPLANTS_OBGYN_ALL_OB
Martin Peters is calling she said that [facility] needs to speak with you about her HYDROcodone-acetaminophen  MG Oral Tab if you could call them ASAP because she only has a ride to get her medications for a little while longer Surgicel Hemostat Fibrillar

## 2025-04-15 NOTE — OB PROVIDER H&P - NSHPPHYSICALEXAM_GEN_ALL_CORE
T(C): 36.7 (04-15-25 @ 14:06), Max: 36.7 (04-15-25 @ 14:06)  HR: 106 (04-15-25 @ 14:09) (106 - 106)  BP: 132/60 (04-15-25 @ 14:09) (132/60 - 132/60)  RR: 14 (04-15-25 @ 14:06) (14 - 14)  SpO2: --    Abd: gravid, soft, NT  VE: deferred- cervix closed on speculum  Grossly ruptured, clear fluid  CTx: irregular ctx noted  FHR: 140 moderate variability, Cat I

## 2025-04-15 NOTE — OB PROVIDER H&P - NS_OBGYNHISTORY_OBGYN_ALL_OB_FT
FT  6lbs arrest at 6cm- recuse cerclage placed for painless dilation   ETOP w/ D&C    GYNHx: g/o Ovarian cysts  Denies STI, PID, abnl PAP, fibroids

## 2025-04-15 NOTE — BRIEF OPERATIVE NOTE - OPERATION/FINDINGS
Pfannenstiel incision, anterior abdominal wall adhesions noted, uterine adhesions to bladder and anterior abdominal wall noted, low transverse uterine incision, cephalic presentation, live male , APGAR 9/9, weight 2625grams, uterus closed in situ, hemostasis noted.

## 2025-04-15 NOTE — OB RN TRIAGE NOTE - FALL HARM RISK - ATTEMPT OOB
If you have any questions regarding your visit, Please contact your care team.  [facility]: [phone]  Women s Health CLINIC HOURS TELEPHONE NUMBER   Cephas Agbeh, M.D. Becky-RN Kylie-RN Heidi-RN Deanna-MA Angela-  Yusra-         Monday-[place]    8:00a.m-4:45 p.m    Tuesday--[place]     8:00a.m-4:45 p.m.    Thursday-[place]    8:00a.m-4:45 p.m.    Friday-[place]    8:00a.m-4:45 p.m    [facility]   [address]   [place] [address]   [phone]   Fax [phone]   Zcelwfs-[phone]     [facility]   [address] [place] [address] [place] [address]   [phone]    [facility]  [address] [address]  [phone]  Xekeaxs-[phone]   Urgent Care locations:    [facility] Monday-Friday  5 pm - 9 pm  Saturday and Sunday   9 am - 5 pm   Monday-Friday   5 pm - 9 pm  Saturday and Sunday  9 am - 5 pm    [phone]   If you need a medication refill, please contact your pharmacy. Please allow 3 business days for your refill to be completed.  As always, Thank you for trusting us with your healthcare needs!    
No

## 2025-04-15 NOTE — OB RN DELIVERY SUMMARY - NSSELHIDDEN_OBGYN_ALL_OB_FT
[NS_DeliveryAttending1_OBGYN_ALL_OB_FT:[id number]=],[NS_DeliveryAssist1_OBGYN_ALL_OB_FT:[email or web address]=],[NS_DeliveryRN_OBGYN_ALL_OB_FT:[id number]=]

## 2025-04-15 NOTE — OB RN INTRAOPERATIVE NOTE - NSSELHIDDEN_OBGYN_ALL_OB_FT
[NS_DeliveryAttending1_OBGYN_ALL_OB_FT:[id number]=],[NS_DeliveryAssist1_OBGYN_ALL_OB_FT:[id number]=],[NS_DeliveryRN_OBGYN_ALL_OB_FT:[id number]=]

## 2025-04-15 NOTE — OB PROVIDER H&P - HISTORY OF PRESENT ILLNESS
Pt is a 29y.o.  @ 34.4wks presents c/o ROM @ 1155am today, clear fluid. Pt denies ctx, VB and reports good FM     # Prior  x 1 for arrest at 6cm, had rescue cerclage for painless dilation

## 2025-04-15 NOTE — OB PROVIDER H&P - ASSESSMENT
29y.o.  @ 34.4wks PPROM, prior  for repeat   Admit to L&D  IVF, labs  GBS/ Vaginitis panel sent  Continuous EFM and toco  SCD's  Mendez catheter  Abdominal prep  Ancef prior to OR  On call to OR  Dr. Duran Aware

## 2025-04-16 ENCOUNTER — TRANSCRIPTION ENCOUNTER (OUTPATIENT)
Age: 30
End: 2025-04-16

## 2025-04-16 LAB
BASOPHILS # BLD AUTO: 0 K/UL — SIGNIFICANT CHANGE UP (ref 0–0.2)
BASOPHILS NFR BLD AUTO: 0 % — SIGNIFICANT CHANGE UP (ref 0–1)
EOSINOPHIL # BLD AUTO: 0.27 K/UL — SIGNIFICANT CHANGE UP (ref 0–0.7)
EOSINOPHIL NFR BLD AUTO: 2.7 % — SIGNIFICANT CHANGE UP (ref 0–8)
HCT VFR BLD CALC: 31.9 % — LOW (ref 37–47)
HGB BLD-MCNC: 9.9 G/DL — LOW (ref 12–16)
LYMPHOCYTES # BLD AUTO: 0.96 K/UL — LOW (ref 1.2–3.4)
LYMPHOCYTES # BLD AUTO: 9.7 % — LOW (ref 20.5–51.1)
MCHC RBC-ENTMCNC: 25.6 PG — LOW (ref 27–31)
MCHC RBC-ENTMCNC: 31 G/DL — LOW (ref 32–37)
MCV RBC AUTO: 82.4 FL — SIGNIFICANT CHANGE UP (ref 81–99)
MONOCYTES # BLD AUTO: 0.61 K/UL — HIGH (ref 0.1–0.6)
MONOCYTES NFR BLD AUTO: 6.2 % — SIGNIFICANT CHANGE UP (ref 1.7–9.3)
NEUTROPHILS # BLD AUTO: 7.41 K/UL — HIGH (ref 1.4–6.5)
NEUTROPHILS NFR BLD AUTO: 75.2 % — SIGNIFICANT CHANGE UP (ref 42.2–75.2)
PLATELET # BLD AUTO: 207 K/UL — SIGNIFICANT CHANGE UP (ref 130–400)
PMV BLD: 11 FL — HIGH (ref 7.4–10.4)
RBC # BLD: 3.87 M/UL — LOW (ref 4.2–5.4)
RBC # FLD: 16.2 % — HIGH (ref 11.5–14.5)
WBC # BLD: 9.85 K/UL — SIGNIFICANT CHANGE UP (ref 4.8–10.8)
WBC # FLD AUTO: 9.85 K/UL — SIGNIFICANT CHANGE UP (ref 4.8–10.8)

## 2025-04-16 RX ORDER — IBUPROFEN 200 MG
1 TABLET ORAL
Qty: 60 | Refills: 0
Start: 2025-04-16

## 2025-04-16 RX ORDER — SIMETHICONE 80 MG
1 TABLET,CHEWABLE ORAL
Qty: 60 | Refills: 0
Start: 2025-04-16

## 2025-04-16 RX ORDER — ACETAMINOPHEN 500 MG/5ML
3 LIQUID (ML) ORAL
Qty: 60 | Refills: 0
Start: 2025-04-16

## 2025-04-16 RX ORDER — PRENATAL 136/IRON/FOLIC ACID 27 MG-1 MG
1 TABLET ORAL
Qty: 0 | Refills: 0 | DISCHARGE
Start: 2025-04-16

## 2025-04-16 RX ORDER — IBUPROFEN 200 MG
600 TABLET ORAL EVERY 6 HOURS
Refills: 0 | Status: DISCONTINUED | OUTPATIENT
Start: 2025-04-16 | End: 2025-04-17

## 2025-04-16 RX ORDER — DEXTROMETHORPHAN HBR, GUAIFENESIN 20; 200 MG/10ML; MG/10ML
10 SOLUTION ORAL ONCE
Refills: 0 | Status: COMPLETED | OUTPATIENT
Start: 2025-04-16 | End: 2025-04-16

## 2025-04-16 RX ORDER — SODIUM CHLORIDE 9 G/1000ML
500 INJECTION, SOLUTION INTRAVENOUS ONCE
Refills: 0 | Status: COMPLETED | OUTPATIENT
Start: 2025-04-16 | End: 2025-04-16

## 2025-04-16 RX ADMIN — Medication 600 MILLIGRAM(S): at 18:21

## 2025-04-16 RX ADMIN — Medication 600 MILLIGRAM(S): at 23:04

## 2025-04-16 RX ADMIN — Medication 600 MILLIGRAM(S): at 11:23

## 2025-04-16 RX ADMIN — Medication 975 MILLIGRAM(S): at 20:39

## 2025-04-16 RX ADMIN — SODIUM CHLORIDE 1000 MILLILITER(S): 9 INJECTION, SOLUTION INTRAVENOUS at 04:10

## 2025-04-16 RX ADMIN — DEXTROMETHORPHAN HBR, GUAIFENESIN 10 MILLILITER(S): 20; 200 SOLUTION ORAL at 23:05

## 2025-04-16 RX ADMIN — Medication 80 MILLIGRAM(S): at 11:53

## 2025-04-16 RX ADMIN — KETOROLAC TROMETHAMINE 30 MILLIGRAM(S): 30 INJECTION, SOLUTION INTRAMUSCULAR; INTRAVENOUS at 06:13

## 2025-04-16 RX ADMIN — KETOROLAC TROMETHAMINE 30 MILLIGRAM(S): 30 INJECTION, SOLUTION INTRAMUSCULAR; INTRAVENOUS at 07:34

## 2025-04-16 RX ADMIN — Medication 600 MILLIGRAM(S): at 15:57

## 2025-04-16 RX ADMIN — Medication 600 MILLIGRAM(S): at 18:41

## 2025-04-16 RX ADMIN — Medication 1 TABLET(S): at 11:23

## 2025-04-16 RX ADMIN — Medication 80 MILLIGRAM(S): at 06:13

## 2025-04-16 RX ADMIN — Medication 80 MILLIGRAM(S): at 18:21

## 2025-04-16 NOTE — DISCHARGE NOTE OB - HOSPITAL COURSE
Patient presented at 34w4d with PPROM and contractions and underwent a repeat  section, QBL 265cc. Her postoperative course was uncomplicated and she met all her milestones,

## 2025-04-16 NOTE — DISCHARGE NOTE OB - CARE PLAN
Principal Discharge DX:	 delivery delivered  Assessment and plan of treatment:	Nothing in the vagina for 6 weeks (no sex, no tampons, no douching). Avoid tub baths, you may shower. Avoid heavy lifting (nothing more than 10lb) for the first 4 weeks after surgery.  If you have a fever of 100.4F or greater, severe vaginal bleeding, or severe abdominal pain, call your Ob/Gyn or come to the emergency department immediately.      FOLLOW UP  - Follow up with your OB/GYN provider in 1 week for an incision check and 6 weeks for your routine post partum visit    PAIN MANAGEMENT:   Alternate Acetaminophen/Tylenol and Ibuprofen/Motrin (if you are eligible). Each of these medications can be taken every six hours. Try to stagger them so that you are taking something for pain every three hours (ex. Take Motrin at 12:00, Tylenol at 3:00, Motrin at 6:00, etc.) to maximize pain relief.  o Dosages       - Tylenol – 500-650 mg every 6 hours as needed       - Motrin/Ibuprofen - 600 mg every 6 hours as needed  o The maximum dose of Tylenol is 4000 mg in 24 hours, the maximum dose of Motrin/ibuprofen is 2400mg in 24 hours  A warm shower or heating pad may also help.  Secondary Diagnosis:	Anemia due to acute blood loss   1

## 2025-04-16 NOTE — DISCHARGE NOTE OB - PATIENT PORTAL LINK FT
You can access the FollowMyHealth Patient Portal offered by [facility] by registering at the following website: [email or web address]. By joining [facility]’s FollowMyHealth portal, you will also be able to view your health information using other applications (apps) compatible with our system.

## 2025-04-16 NOTE — DISCHARGE NOTE OB - NSDCCPGOAL_GEN_ALL_CORE_FT
Addended by: NAYELI DEVLIN on: 7/28/2021 08:49 AM     Modules accepted: Orders    
Healthy mom healthy baby

## 2025-04-16 NOTE — DISCHARGE NOTE OB - CARE PROVIDER_API CALL
Clair Duran  Obstetrics and Gynecology  [address] [place] [address]  Phone: [phone]  Fax: [phone]  Follow Up Time:

## 2025-04-16 NOTE — DISCHARGE NOTE OB - MEDICATION SUMMARY - MEDICATIONS TO TAKE
I will START or STAY ON the medications listed below when I get home from the hospital:    ibuprofen 600 mg oral tablet  -- 1 tab(s) by mouth every 6 hours  -- Indication: For Pain    acetaminophen 325 mg oral tablet  -- 3 tab(s) by mouth every 6 hours  -- Indication: For Pain    Prenatal Multivitamins with Folic Acid 1 mg oral tablet  -- 1 tab(s) by mouth once a day  -- Indication: For Healthy mom    simethicone 80 mg oral tablet, chewable  -- 1 tab(s) by mouth every 4 hours  -- Indication: For Gas pain   I will START or STAY ON the medications listed below when I get home from the hospital:    ibuprofen 600 mg oral tablet  -- 1 tab(s) by mouth every 6 hours  -- Indication: For Pain    acetaminophen 325 mg oral tablet  -- 3 tab(s) by mouth every 6 hours  -- Indication: For Pain    oxyCODONE 5 mg oral tablet  -- 1 tab(s) by mouth every 6 hours as needed for Moderate to Severe Pain (4-10) MDD: 4  -- Indication: For pain    Prenatal Multivitamins with Folic Acid 1 mg oral tablet  -- 1 tab(s) by mouth once a day  -- Indication: For Healthy mom    simethicone 80 mg oral tablet, chewable  -- 1 tab(s) by mouth every 4 hours  -- Indication: For Gas pain

## 2025-04-16 NOTE — DISCHARGE NOTE OB - FINANCIAL ASSISTANCE
[facility] provides services at a reduced cost to those who are determined to be eligible through [facility]’s financial assistance program. Information regarding [facility]’s financial assistance program can be found by going to [email or web address] or by calling [phone].

## 2025-04-17 VITALS
RESPIRATION RATE: 18 BRPM | HEART RATE: 102 BPM | OXYGEN SATURATION: 100 % | TEMPERATURE: 98 F | SYSTOLIC BLOOD PRESSURE: 114 MMHG | DIASTOLIC BLOOD PRESSURE: 74 MMHG

## 2025-04-17 LAB
GROUP B BETA STREP DNA (PCR): SIGNIFICANT CHANGE UP
SOURCE GROUP B STREP: SIGNIFICANT CHANGE UP
SURGICAL PATHOLOGY STUDY: SIGNIFICANT CHANGE UP

## 2025-04-17 RX ORDER — OXYCODONE HYDROCHLORIDE 30 MG/1
1 TABLET ORAL
Qty: 8 | Refills: 0
Start: 2025-04-17

## 2025-04-17 RX ORDER — OXYCODONE HYDROCHLORIDE 30 MG/1
5 TABLET ORAL
Refills: 0 | Status: DISCONTINUED | OUTPATIENT
Start: 2025-04-17 | End: 2025-04-17

## 2025-04-17 RX ADMIN — Medication 975 MILLIGRAM(S): at 10:12

## 2025-04-17 RX ADMIN — Medication 975 MILLIGRAM(S): at 03:00

## 2025-04-17 RX ADMIN — OXYCODONE HYDROCHLORIDE 5 MILLIGRAM(S): 30 TABLET ORAL at 15:26

## 2025-04-17 RX ADMIN — Medication 1 TABLET(S): at 12:34

## 2025-04-17 RX ADMIN — Medication 80 MILLIGRAM(S): at 13:59

## 2025-04-17 RX ADMIN — Medication 80 MILLIGRAM(S): at 10:13

## 2025-04-17 RX ADMIN — Medication 975 MILLIGRAM(S): at 09:34

## 2025-04-17 RX ADMIN — Medication 600 MILLIGRAM(S): at 13:58

## 2025-04-17 RX ADMIN — Medication 600 MILLIGRAM(S): at 06:20

## 2025-04-17 RX ADMIN — Medication 600 MILLIGRAM(S): at 12:34

## 2025-04-17 NOTE — PROGRESS NOTE ADULT - ASSESSMENT
29y now P2, s/p repeat c/s #2, POD 1, [id number], recovering well.     - pain management with PO pain meds   - monitor vitals/bleeding   - encourage incentive spirometry   - ambulation/PO hydration  - advance diet as tolerated   - SCDs/lovenox for DVT prophylaxis   - f/u AM labs   - routine postop care         
A/P: 29y now P2, s/p repeat c/s #2 at 34w 2/2 PPROM, POD 1, [id number], recovering well    -  Pain control per routine  -  encourage ambulation  -  encourage incentive spirometry  -  PO hydration  -  Advance to regular diet  -  rhodes in situ; DC this AM, f/u TOV  -  DVT prophylaxis: ambulation, SCDs, Lovenox  - Provide breast pump    To be discussed with Dr. Duran  
A/P: 29y now P2, s/p repeat c/s #2 at 34w 2/2 PPROM, POD 2, [id number], recovering well.    -  Pain control per routine  -  encourage ambulation  -  encourage incentive spirometry  -  simethicone  -  PO hydration  -  tolerating regular diet  -  voiding appropriately  -  DVT prophylaxis: ambulation, SCDs, Lovenox    To be discussed with Dr. Duran

## 2025-04-17 NOTE — PROGRESS NOTE ADULT - SUBJECTIVE AND OBJECTIVE BOX
PGY 2 NOTE  Chief Complaint: Postpartum    HPI: Pt doing well, pain well controlled. No acute complaints. Pt reports tolerating a regular diet, and desires to breastfeed once rested. Denies HA, lightheadedness, palpitations, N/V, fevers, chills, CP, SOB, LE edema, heavy vaginal bleeding. Denies flatus.     PAST MEDICAL & SURGICAL HISTORY:  Mild asthma  No h/o hospitalizations or intubations, exascerbated by seasonal allergies  History of hysteroscopy  polypectomy  History of  section  History of dilatation and curettage    Physical Exam  Vital Signs Last 24 Hrs  T(F): 97.5 (15 Apr 2025 23:48), Max: 98.1 (15 Apr 2025 14:06)  HR: 94 (15 Apr 2025 23:48) (77 - 106)  BP: 120/68 (15 Apr 2025 23:48) (94/50 - 132/60)  RR: 18 (15 Apr 2025 23:48) (14 - 18)    Physical exam:  General - AAOx3, NAD  Heart - S1S2, RRR  Lungs - CTA BL  Abdomen:  - Soft, nontender, nondistended, BS+  - Fundus firm, nontender, below the umbilicus  -Steris in place, incision c/d/i  Pelvis/Vagina - Normal rubra lochia  Extremities - No calf tenderness, no swelling    Labs:                        10.4   9.48  )-----------( 232      ( 15 Apr 2025 14:40 )             33.0     ABO RH Interpretation: O POS (04-15-25 @ 14:54)  Antibody Screen: NEG (04-15-25 @ 14:54)    Prenatal Syphilis Test: Nonreact (04-15-25 @ 14:40)      acetaminophen     Tablet .. 975 milliGRAM(s) Oral <User Schedule>, Routine  diphenhydrAMINE 25 milliGRAM(s) Oral every 6 hours, Routine PRN Pruritus  diphtheria/tetanus/pertussis (acellular) Vaccine (Adacel) 0.5 milliLiter(s) IntraMuscular once, Now  ibuprofen  Tablet. 600 milliGRAM(s) Oral every 6 hours, Routine  ketorolac   Injectable 30 milliGRAM(s) IV Push every 6 hours, Routine  lactated ringers. 1000 milliLiter(s) (125 mL/Hr) IV Continuous <Continuous>, Routine  lactated ringers. 1000 milliLiter(s) (108 mL/Hr) IV Continuous <Continuous>, Routine  lanolin Ointment 1 Application(s) Topical every 6 hours, Routine PRN Sore Nipples  magnesium hydroxide Suspension 30 milliLiter(s) Oral two times a day, Routine PRN Constipation  oxyCODONE    IR 5 milliGRAM(s) Oral every 3 hours, Routine PRN Moderate to Severe Pain (4-10)  oxyCODONE    IR 5 milliGRAM(s) Oral once, Routine PRN Moderate to Severe Pain (4-10)  oxytocin Infusion  milliUNIT(s)/Min (42 mL/Hr) IV Continuous <Continuous>, Routine  prenatal multivitamin 1 Tablet(s) Oral daily, Routine  simethicone 80 milliGRAM(s) Chew every 4 hours, Routine    
PGY1 note  Chief Complaint: Post  section    Patient seen and examined. Pain well controlled at this time. No complaints at this time. Denies fever, chills, nausea, vomiting, chest pain, shortness of breath, severe abdominal pain, heavy vaginal bleeding. Patient is ambulating.   Passing flatus, Denies bowel movement.   Diet: Regular, tolerating PO  Voiding: Voiding without difficulty, no dysuria     PAST MEDICAL & SURGICAL HISTORY:  Mild asthma  No h/o hospitalizations or intubations, exascerbated by seasonal allergies    History of hysteroscopy  polypectomy    History of  section    History of dilatation and curettage      MEDICATIONS  (STANDING):  acetaminophen     Tablet .. 975 milliGRAM(s) Oral <User Schedule>  diphtheria/tetanus/pertussis (acellular) Vaccine (Adacel) 0.5 milliLiter(s) IntraMuscular once  ibuprofen  Tablet. 600 milliGRAM(s) Oral every 6 hours  lactated ringers. 1000 milliLiter(s) (108 mL/Hr) IV Continuous <Continuous>  lactated ringers. 1000 milliLiter(s) (125 mL/Hr) IV Continuous <Continuous>  oxytocin Infusion  milliUNIT(s)/Min (42 mL/Hr) IV Continuous <Continuous>  prenatal multivitamin 1 Tablet(s) Oral daily  simethicone 80 milliGRAM(s) Chew every 4 hours    MEDICATIONS  (PRN):  diphenhydrAMINE 25 milliGRAM(s) Oral every 6 hours PRN Pruritus  lanolin Ointment 1 Application(s) Topical every 6 hours PRN Sore Nipples  magnesium hydroxide Suspension 30 milliLiter(s) Oral two times a day PRN Constipation  oxyCODONE    IR 5 milliGRAM(s) Oral every 3 hours PRN Moderate to Severe Pain (4-10)  oxyCODONE    IR 5 milliGRAM(s) Oral once PRN Moderate to Severe Pain (4-10)      Physical Exam  Vital Signs Last 24 Hrs  T(F): 98 (2025 07:13), Max: 98.3 (2025 23:00)  HR: 101 (2025 07:13) (99 - 102)  BP: 127/82 (2025 07:13) (111/73 - 127/82)  RR: 18 (2025 07:13) (18 - 20)    Physical exam:  General - AAOx3, NAD  Abdomen:  - Soft, appropriately tender, mildly distended, BS+. Clean, dry, intact steri strips in place over pfannenstiel skin incision.  - Fundus firm, appropriately tender, below the umbilicus  - No rebound or guarding  Pelvis/Vagina - Normal Lochia  Extremities - No calf tenderness, no swelling    Labs:                        9.9    9.85  )-----------( 207      ( 2025 06:14 )             31.9                         10.4   9.48  )-----------( 232      ( 15 Apr 2025 14:40 )             33.0     Antibody Screen: NEG (04-15-25 @ 14:54)      Antibody Screen: NEG (04-15-25 @ 14:54)      A/P   Antibody Screen: NEG (04-15-25 @ 14:54)    
PGY2 note  Chief Complaint: Post  section    Patient seen and examined. Pain well controlled at this time. No complaints at this time. Denies fever, chills, nausea, vomiting, chest pain, shortness of breath, severe abdominal pain, heavy vaginal bleeding. Patient is out of bed to chair, denies passing flatus, tolerating clears, rhodes in place. She denies dizziness, dyspnea, chest palpitation    PAST MEDICAL & SURGICAL HISTORY:  Mild asthma  No h/o hospitalizations or intubations, exascerbated by seasonal allergies      History of hysteroscopy  polypectomy  History of  section  History of dilatation and curettage      MEDICATIONS  (STANDING):  acetaminophen     Tablet .. 975 milliGRAM(s) Oral <User Schedule>  diphtheria/tetanus/pertussis (acellular) Vaccine (Adacel) 0.5 milliLiter(s) IntraMuscular once  ibuprofen  Tablet. 600 milliGRAM(s) Oral every 6 hours  lactated ringers. 1000 milliLiter(s) (108 mL/Hr) IV Continuous <Continuous>  lactated ringers. 1000 milliLiter(s) (125 mL/Hr) IV Continuous <Continuous>  oxytocin Infusion  milliUNIT(s)/Min (42 mL/Hr) IV Continuous <Continuous>  prenatal multivitamin 1 Tablet(s) Oral daily  simethicone 80 milliGRAM(s) Chew every 4 hours    MEDICATIONS  (PRN):  diphenhydrAMINE 25 milliGRAM(s) Oral every 6 hours PRN Pruritus  lanolin Ointment 1 Application(s) Topical every 6 hours PRN Sore Nipples  magnesium hydroxide Suspension 30 milliLiter(s) Oral two times a day PRN Constipation  oxyCODONE    IR 5 milliGRAM(s) Oral every 3 hours PRN Moderate to Severe Pain (4-10)  oxyCODONE    IR 5 milliGRAM(s) Oral once PRN Moderate to Severe Pain (4-10)      Physical Exam  Vital Signs Last 24 Hrs  T(F): 97.8 (2025 03:12), Max: 98.1 (15 Apr 2025 14:06)  HR: 93 (2025 03:12) (77 - 106)  BP: 106/68 (2025 03:12) (94/50 - 132/60)  RR: 18 (2025 03:12) (14 - 18)    Physical exam:  General - AAOx3, NAD  Abdomen:  - Soft, appropriately tender, mildly distended. Clean, dry, intact steri strips and dermabond in place over pfannenstiel skin incision.  - Fundus firm, appropriately tender, below the umbilicus  - No rebound or guarding  Pelvis/Vagina - Normal Lochia  Extremities - No calf tenderness, no swelling    Labs:                        10.4   9.48  )-----------( 232      ( 15 Apr 2025 14:40 )             33.0     Antibody Screen: NEG (04-15-25 @ 14:54)    Prenatal Syphilis Test: Nonreact (04-15-25 @ 14:40)    Antibody Screen: NEG (04-15-25 @ 14:54)

## 2025-04-19 LAB
A VAGINAE DNA VAG QL NAA+PROBE: SIGNIFICANT CHANGE UP
BVAB2 DNA VAG QL NAA+PROBE: SIGNIFICANT CHANGE UP
C ALBICANS DNA VAG QL NAA+PROBE: NEGATIVE — SIGNIFICANT CHANGE UP
C GLABRATA DNA VAG QL NAA+PROBE: NEGATIVE — SIGNIFICANT CHANGE UP
C KRUSEI DNA VAG QL NAA+PROBE: NEGATIVE — SIGNIFICANT CHANGE UP
C LUSITANIAE DNA VAG QL NAA+PROBE: NEGATIVE — SIGNIFICANT CHANGE UP
C TRACH RRNA SPEC QL NAA+PROBE: NEGATIVE — SIGNIFICANT CHANGE UP
CANDIDA DNA VAG QL NAA+PROBE: NEGATIVE — SIGNIFICANT CHANGE UP
MEGA1 DNA VAG QL NAA+PROBE: SIGNIFICANT CHANGE UP
N GONORRHOEA RRNA SPEC QL NAA+PROBE: NEGATIVE — SIGNIFICANT CHANGE UP
T VAGINALIS RRNA SPEC QL NAA+PROBE: NEGATIVE — SIGNIFICANT CHANGE UP

## 2025-04-23 DIAGNOSIS — Z28.21 IMMUNIZATION NOT CARRIED OUT BECAUSE OF PATIENT REFUSAL: ICD-10-CM

## 2025-04-23 DIAGNOSIS — Z3A.34 34 WEEKS GESTATION OF PREGNANCY: ICD-10-CM

## 2025-04-23 DIAGNOSIS — N73.6 FEMALE PELVIC PERITONEAL ADHESIONS (POSTINFECTIVE): ICD-10-CM

## 2025-04-23 DIAGNOSIS — O42.92 FULL-TERM PREMATURE RUPTURE OF MEMBRANES, UNSPECIFIED AS TO LENGTH OF TIME BETWEEN RUPTURE AND ONSET OF LABOR: ICD-10-CM

## 2025-04-23 DIAGNOSIS — Z28.09 IMMUNIZATION NOT CARRIED OUT BECAUSE OF OTHER CONTRAINDICATION: ICD-10-CM
